# Patient Record
Sex: FEMALE | Race: WHITE | NOT HISPANIC OR LATINO | Employment: UNEMPLOYED | URBAN - METROPOLITAN AREA
[De-identification: names, ages, dates, MRNs, and addresses within clinical notes are randomized per-mention and may not be internally consistent; named-entity substitution may affect disease eponyms.]

---

## 2017-05-12 DIAGNOSIS — G43.109 MIGRAINE WITH AURA AND WITHOUT STATUS MIGRAINOSUS, NOT INTRACTABLE: ICD-10-CM

## 2017-05-12 RX ORDER — IBUPROFEN 600 MG/1
TABLET, FILM COATED ORAL
Qty: 90 TABLET | Refills: 0 | Status: SHIPPED | OUTPATIENT
Start: 2017-05-12 | End: 2017-07-21

## 2017-05-12 NOTE — TELEPHONE ENCOUNTER
Routing refill request to provider for review/approval because:  Vida given x1 and patient did not follow up, please advise  Labs not current:  Alt/Ast/bmp  Required for refill protocol.  Pt has not had them done yet.   Noted, last filled for #90 on 9/23/16

## 2017-05-20 ENCOUNTER — OFFICE VISIT (OUTPATIENT)
Dept: URGENT CARE | Facility: URGENT CARE | Age: 22
End: 2017-05-20
Payer: COMMERCIAL

## 2017-05-20 VITALS
DIASTOLIC BLOOD PRESSURE: 74 MMHG | SYSTOLIC BLOOD PRESSURE: 92 MMHG | BODY MASS INDEX: 18.77 KG/M2 | HEART RATE: 93 BPM | OXYGEN SATURATION: 99 % | WEIGHT: 116.8 LBS | HEIGHT: 66 IN | TEMPERATURE: 98.4 F

## 2017-05-20 DIAGNOSIS — J45.901 ASTHMA EXACERBATION: Primary | ICD-10-CM

## 2017-05-20 PROCEDURE — 99213 OFFICE O/P EST LOW 20 MIN: CPT | Performed by: PHYSICIAN ASSISTANT

## 2017-05-20 RX ORDER — PREDNISONE 20 MG/1
20 TABLET ORAL 2 TIMES DAILY
Qty: 8 TABLET | Refills: 0 | Status: SHIPPED | OUTPATIENT
Start: 2017-05-20 | End: 2017-05-24

## 2017-05-20 NOTE — PROGRESS NOTES
HPI:  Tanya is a 21 yo female who presents for cough & wheezing x 1 week.  Hx of asthma.  Does not need inhaler usually but has been using inhaler 2-3 times a day, usually at night.   Had sore throat but this has resolved.  Denies fever.  Reports mild nasal congestion.    ROS:  See HPI      PE:  Vitals & nursing notes reviewed  Constitutional:  Alert, well nourished, well-developed, NAD  Head:  Atraumatic, normocephalic  Eyes:  Perrla, EOMI, conjunctiva:  Pink   Sclera:  Anicteric  Ears:  Canals clear BL, TM pearly BL  Throat:  No erythema, exudates, or edema to postoropharynx  Neck:  Supple, no cervical LAD  Lungs:  (+) mild wheezes, No rhonchi, or rales  CV:  RRR,  no murmur appreciated      ASSESSMENT:  (J45.901) Asthma exacerbation  (primary encounter diagnosis)  Plan: predniSONE (DELTASONE) 20 MG tablet      Albuterol inhaler PRN for wheezing & SOB.   Cool mist humdifier.  Warm liquids and/or honey for cough spasms and suppression.  Tylenol or advil for pain, HA, muscles aches, & fever PRN.   F/U with PCP if sx persist or worsen.

## 2017-05-20 NOTE — MR AVS SNAPSHOT
"              After Visit Summary   5/20/2017    Tanya Pearson    MRN: [id number]           Patient Information     Date Of Birth          1995        Visit Information        Provider Department      5/20/2017 5:05 PM Bethanie Barrett PA-C [facility]        Today's Diagnoses     Asthma exacerbation    -  1       Follow-ups after your visit        Who to contact     If you have questions or need follow up information about today's clinic visit or your schedule please contact [facility] directly at [phone].  Normal or non-critical lab and imaging results will be communicated to you by [employer], letter or phone within 4 business days after the clinic has received the results. If you do not hear from us within 7 days, please contact the clinic through [employer] or phone. If you have a critical or abnormal lab result, we will notify you by phone as soon as possible.  Submit refill requests through [employer] or call your pharmacy and they will forward the refill request to us. Please allow 3 business days for your refill to be completed.          Additional Information About Your Visit        MyChart Information     [employer] gives you secure access to your electronic health record. If you see a primary care provider, you can also send messages to your care team and make appointments. If you have questions, please call your primary care clinic.  If you do not have a primary care provider, please call [phone] and they will assist you.        Care EveryWhere ID     This is your Care EveryWhere ID. This could be used by other organizations to access your [place] medical records  [id number]        Your Vitals Were     Pulse Temperature Height Last Period Pulse Oximetry BMI (Body Mass Index)    93 98.4  F (36.9  C) (Oral) 5' 6\" (1.676 m) 04/20/2017 (Approximate) 99% 18.85 kg/m2       Blood Pressure from Last 3 Encounters:   05/20/17 92/74   11/30/16 " 120/75   08/31/16 107/75    Weight from Last 3 Encounters:   05/20/17 116 lb 12.8 oz (53 kg)   11/30/16 117 lb (53.1 kg) (28 %)*   01/25/16 118 lb (53.5 kg) (33 %)*     * Growth percentiles are based on [facility] 2-20 Years data.              Today, you had the following     No orders found for display         Today's Medication Changes          These changes are accurate as of: 5/20/17  6:42 PM.  If you have any questions, ask your nurse or doctor.               Start taking these medicines.        Dose/Directions    predniSONE 20 MG tablet   Commonly known as:  DELTASONE   Used for:  Asthma exacerbation   Started by:  Bethanie Barrett PA-C        Dose:  20 mg   Take 1 tablet (20 mg) by mouth 2 times daily for 4 days   Quantity:  8 tablet   Refills:  0            Where to get your medicines      These medications were sent to [facility] [id number] - [place], [place] - [address] AT [facility] [address]  [facility] [address], [place] [address]    Hours:  24-hours Phone:  [phone]     predniSONE 20 MG tablet                Primary Care Provider Office Phone # Fax #    Dara Pickett [phone]       [address] [address]        Thank you!     Thank you for choosing [facility]  for your care. Our goal is always to provide you with excellent care. Hearing back from our patients is one way we can continue to improve our services. Please take a few minutes to complete the written survey that you may receive in the mail after your visit with us. Thank you!             Your Updated Medication List - Protect others around you: Learn how to safely use, store and throw away your medicines at www.disposemymeds.org.          This list is accurate as of: 5/20/17  6:42 PM.  Always use your most recent med list.                   Brand Name Dispense Instructions for use    albuterol 108 (90 BASE) MCG/ACT Inhaler    PROAIR  HFA/PROVENTIL HFA/VENTOLIN HFA    1 Inhaler    Inhale 2 puffs into the lungs every 4 hours as needed for shortness of breath / dyspnea or wheezing       eletriptan 40 MG tablet    RELPAX    18 tablet    Take 1 tablet (40 mg) by mouth at onset of headache for migraine May repeat dose in 2 hours.  Do not exceed 80 mg in 24 hours       gabapentin 300 MG capsule    NEURONTIN    120 capsule    Reported on 5/20/2017       ibuprofen 600 MG tablet    ADVIL/MOTRIN    90 tablet    Take one po q day prn headache.  Needs labs for further refills       MULTI-VITAMINS PO      Reported on 5/20/2017       norethindrone 0.35 MG per tablet    MICRONOR    84 tablet    Take 1 tablet (0.35 mg) by mouth daily       predniSONE 20 MG tablet    DELTASONE    8 tablet    Take 1 tablet (20 mg) by mouth 2 times daily for 4 days

## 2017-05-20 NOTE — NURSING NOTE
"Chief Complaint   Patient presents with     Urgent Care     Asthma     Reports hx of mild seasonal asthma, usually under good control, doesn't take maintenance inhaler.  Just over a week ago started to have sore throat and cough; sore throat resolved but productive cough and stuffy nose have continued; also was cleaning out ricky house recently.  Has been feeling short of breath last few days and wheezing at night.  Has been using inhaler x 2 at night but doesn't really help.     Initial BP 92/74  Pulse 93  Temp 98.4  F (36.9  C) (Oral)  Ht 5' 6\" (1.676 m)  Wt 116 lb 12.8 oz (53 kg)  LMP 04/20/2017 (Approximate)  SpO2 99%  BMI 18.85 kg/m2 Estimated body mass index is 18.85 kg/(m^2) as calculated from the following:    Height as of this encounter: 5' 6\" (1.676 m).    Weight as of this encounter: 116 lb 12.8 oz (53 kg)..  BP completed using cuff size: small eren Rangel RN  "

## 2017-07-21 DIAGNOSIS — G43.109 MIGRAINE WITH AURA AND WITHOUT STATUS MIGRAINOSUS, NOT INTRACTABLE: ICD-10-CM

## 2017-07-21 RX ORDER — IBUPROFEN 600 MG/1
TABLET, FILM COATED ORAL
Qty: 30 TABLET | Refills: 0 | Status: SHIPPED | OUTPATIENT
Start: 2017-07-21 | End: 2017-08-07

## 2017-07-21 NOTE — TELEPHONE ENCOUNTER
Medication is being filled for 1 time refill only due to:  Patient needs labs BMP/ast/alt. Future labs ordered Yes.

## 2017-08-04 RX ORDER — ACETAMINOPHEN AND CODEINE PHOSPHATE 120; 12 MG/5ML; MG/5ML
1 SOLUTION ORAL DAILY
Qty: 84 TABLET | Refills: 3 | Status: CANCELLED | OUTPATIENT
Start: 2017-08-04

## 2017-08-04 RX ORDER — ELETRIPTAN HYDROBROMIDE 40 MG/1
40 TABLET, FILM COATED ORAL
Qty: 18 TABLET | Refills: 1 | Status: CANCELLED | OUTPATIENT
Start: 2017-08-04

## 2017-08-04 RX ORDER — IBUPROFEN 600 MG/1
TABLET, FILM COATED ORAL
Qty: 30 TABLET | Refills: 0 | Status: CANCELLED | OUTPATIENT
Start: 2017-08-04

## 2017-08-04 RX ORDER — ALBUTEROL SULFATE 90 UG/1
2 AEROSOL, METERED RESPIRATORY (INHALATION) EVERY 4 HOURS PRN
Qty: 1 INHALER | Refills: 3 | Status: CANCELLED | OUTPATIENT
Start: 2017-08-04

## 2017-08-04 RX ORDER — GABAPENTIN 300 MG/1
CAPSULE ORAL
Qty: 120 CAPSULE | Refills: 11 | Status: CANCELLED | OUTPATIENT
Start: 2017-08-04

## 2017-08-07 DIAGNOSIS — G43.109 MIGRAINE WITH AURA AND WITHOUT STATUS MIGRAINOSUS, NOT INTRACTABLE: ICD-10-CM

## 2017-08-07 RX ORDER — IBUPROFEN 600 MG/1
TABLET, FILM COATED ORAL
Qty: 30 TABLET | Refills: 0 | Status: ON HOLD | OUTPATIENT
Start: 2017-08-07 | End: 2021-04-19

## 2017-08-07 NOTE — TELEPHONE ENCOUNTER
Pt has appt for this week with Nieves,  Per MD OK to refill today and get labs at appt.  Put labs needed in appt notes

## 2017-08-09 ENCOUNTER — OFFICE VISIT (OUTPATIENT)
Dept: FAMILY MEDICINE | Facility: CLINIC | Age: 22
End: 2017-08-09

## 2017-08-09 VITALS
BODY MASS INDEX: 19.13 KG/M2 | HEIGHT: 66 IN | DIASTOLIC BLOOD PRESSURE: 79 MMHG | OXYGEN SATURATION: 99 % | TEMPERATURE: 97.8 F | HEART RATE: 79 BPM | SYSTOLIC BLOOD PRESSURE: 121 MMHG | WEIGHT: 119 LBS

## 2017-08-09 DIAGNOSIS — I73.00 RAYNAUD'S DISEASE WITHOUT GANGRENE: Primary | ICD-10-CM

## 2017-08-09 ASSESSMENT — PAIN SCALES - GENERAL: PAINLEVEL: NO PAIN (0)

## 2017-08-09 NOTE — NURSING NOTE
"22 year old  Chief Complaint   Patient presents with     RECHECK     discuss bad circulation in toes and fingers. Toes turn almost black when cold. Very red when hot. Onset for the past few years. Denies any pain or sensations when they turn different colors.       Blood pressure 121/79, pulse 79, temperature 97.8  F (36.6  C), temperature source Oral, height 5' 5.98\" (167.6 cm), weight 119 lb (54 kg), SpO2 99 %, not currently breastfeeding. Body mass index is 19.22 kg/(m^2).  Patient Active Problem List   Diagnosis     Migraine with aura and without status migrainosus, not intractable     Mild persistent asthma without complication     Dysmenorrhea     Generalized anxiety disorder       Wt Readings from Last 2 Encounters:   08/09/17 119 lb (54 kg)   05/20/17 116 lb 12.8 oz (53 kg)     BP Readings from Last 3 Encounters:   08/09/17 121/79   05/20/17 92/74   11/30/16 120/75         Current Outpatient Prescriptions   Medication     ibuprofen (ADVIL/MOTRIN) 600 MG tablet     albuterol (PROAIR HFA/PROVENTIL HFA/VENTOLIN HFA) 108 (90 BASE) MCG/ACT Inhaler     norethindrone (MICRONOR) 0.35 MG per tablet     eletriptan (RELPAX) 40 MG tablet     gabapentin (NEURONTIN) 300 MG capsule     Multiple Vitamin (MULTI-VITAMINS PO)     No current facility-administered medications for this visit.        Social History   Substance Use Topics     Smoking status: Never Smoker     Smokeless tobacco: Never Used     Alcohol use No       Health Maintenance Due   Topic Date Due     CHLAMYDIA SCREENING  1995     ASTHMA ACTION PLAN Q1 YR  02/10/2002     ASTHMA CONTROL TEST Q6 MOS  05/30/2017       No results found for: RUSLAN Valenzuela MA  August 9, 2017 4:07 PM    "

## 2017-08-09 NOTE — MR AVS SNAPSHOT
"              After Visit Summary   8/9/2017    Tanya Pearson    MRN: [id number]           Patient Information     Date Of Birth          1995        Visit Information        Provider Department      8/9/2017 4:00 PM Dara Pickett MD [facility]        Today's Diagnoses     Raynaud's disease    -  1       Follow-ups after your visit        Who to contact     Please call your clinic at [phone] to:    Ask questions about your health    Make or cancel appointments    Discuss your medicines    Learn about your test results    Speak to your doctor   If you have compliments or concerns about an experience at your clinic, or if you wish to file a complaint, please contact [facility] Patient Relations at [phone] or email us at [email or web address]         Additional Information About Your Visit        MyChart Information     [employer] gives you secure access to your electronic health record. If you see a primary care provider, you can also send messages to your care team and make appointments. If you have questions, please call your primary care clinic.  If you do not have a primary care provider, please call [phone] and they will assist you.      [employer] is an electronic gateway that provides easy, online access to your medical records. With [employer], you can request a clinic appointment, read your test results, renew a prescription or communicate with your care team.     To access your existing account, please contact your [facility] or call [phone] for assistance.        Care EveryWhere ID     This is your Care EveryWhere ID. This could be used by other organizations to access your [place] medical records  [id number]        Your Vitals Were     Pulse Temperature Height Pulse Oximetry BMI (Body Mass Index)       79 97.8  F (36.6  C) (Oral) 5' 5.98\" (167.6 cm) 99% 19.22 kg/m2        Blood Pressure from " Last 3 Encounters:   08/09/17 121/79   05/20/17 92/74   11/30/16 120/75    Weight from Last 3 Encounters:   08/09/17 119 lb (54 kg)   05/20/17 116 lb 12.8 oz (53 kg)   11/30/16 117 lb (53.1 kg) (28 %)*     * Growth percentiles are based on [facility] 2-20 Years data.              Today, you had the following     No orders found for display       Primary Care Provider Office Phone # Fax #    Dara Pickett [phone] [address] [place] [address]        Equal Access to Services     [facility]: Hadii makenzie Edouard, sacha fragoso, radha sequeira, tani anderson . So [facility] [phone].    ATENCIÓN: Si habla español, tiene a sherwood disposición servicios gratuitos de asistencia lingüística. [facility] [phone].    We comply with applicable federal civil rights laws and [place] laws. We do not discriminate on the basis of race, color, national origin, age, disability sex, sexual orientation or gender identity.            Thank you!     Thank you for choosing [facility]  for your care. Our goal is always to provide you with excellent care. Hearing back from our patients is one way we can continue to improve our services. Please take a few minutes to complete the written survey that you may receive in the mail after your visit with us. Thank you!             Your Updated Medication List - Protect others around you: Learn how to safely use, store and throw away your medicines at www.disposemymeds.org.          This list is accurate as of: 8/9/17  4:24 PM.  Always use your most recent med list.                   Brand Name Dispense Instructions for use Diagnosis    albuterol 108 (90 BASE) MCG/ACT Inhaler    PROAIR HFA/PROVENTIL HFA/VENTOLIN HFA    1 Inhaler    Inhale 2 puffs into the lungs every 4 hours as needed for shortness of breath / dyspnea or wheezing    Mild intermittent asthma without complication       eletriptan 40 MG  tablet    RELPAX    18 tablet    Take 1 tablet (40 mg) by mouth at onset of headache for migraine May repeat dose in 2 hours.  Do not exceed 80 mg in 24 hours    Migraine with aura and without status migrainosus, not intractable       gabapentin 300 MG capsule    NEURONTIN    120 capsule    Reported on 5/20/2017        ibuprofen 600 MG tablet    ADVIL/MOTRIN    30 tablet    Take one po q day prn headache.  Needs labs for further refills    Migraine with aura and without status migrainosus, not intractable       MULTI-VITAMINS PO      Reported on 5/20/2017        norethindrone 0.35 MG per tablet    MICRONOR    84 tablet    Take 1 tablet (0.35 mg) by mouth daily    Dysmenorrhea

## 2017-08-09 NOTE — PROGRESS NOTES
Tanya Pearson is a 22 year old female here for the following issues:    Concerns about circulation  Tanya is a 21 yo woman, accompanied by her mother in clinic today. She really has no complaint but her mom was very concerned that her feet looked blue. Tanya reports this comes and goes and occurs without pain or skin changes. She described cold fingertips and toes, worse in the winter months. Her sister has diagnosis of pernio. She is a healthy 21 yo woman, without history of joint pain, skin rashes, hypertension. She does not smoke.     ROS:  Gen: no malaise or fever  Cor : no chest pain or palpitations  Lung: no SOB or dyspnea  GI: no change in appetite, no pain  Ext: no pain, but extremities feel cold, no skin break down  MS: no arthralgias    Patient Active Problem List   Diagnosis     Migraine with aura and without status migrainosus, not intractable     Mild persistent asthma without complication     Dysmenorrhea     Generalized anxiety disorder       Current Outpatient Prescriptions   Medication Sig Dispense Refill     ibuprofen (ADVIL/MOTRIN) 600 MG tablet Take one po q day prn headache.  Needs labs for further refills 30 tablet 0     albuterol (PROAIR HFA/PROVENTIL HFA/VENTOLIN HFA) 108 (90 BASE) MCG/ACT Inhaler Inhale 2 puffs into the lungs every 4 hours as needed for shortness of breath / dyspnea or wheezing 1 Inhaler 3     norethindrone (MICRONOR) 0.35 MG per tablet Take 1 tablet (0.35 mg) by mouth daily 84 tablet 3     eletriptan (RELPAX) 40 MG tablet Take 1 tablet (40 mg) by mouth at onset of headache for migraine May repeat dose in 2 hours.  Do not exceed 80 mg in 24 hours (Patient not taking: Reported on 5/20/2017) 18 tablet 1     gabapentin (NEURONTIN) 300 MG capsule Reported on 5/20/2017 120 capsule 11     Multiple Vitamin (MULTI-VITAMINS PO) Reported on 5/20/2017         Allergies   Allergen Reactions     Amoxicillin Rash        EXAM  /79 (BP Location: Right arm, Patient Position:  "Chair, Cuff Size: Adult Regular)  Pulse 79  Temp 97.8  F (36.6  C) (Oral)  Ht 5' 5.98\" (167.6 cm)  Wt 119 lb (54 kg)  SpO2 99%  BMI 19.22 kg/m2  Gen: Alert, pleasant, NAD  Cor: S1S2 no murmur  Lungs CTA  Hands: fingertips are cool but pink fingertips. Radial pulses strong  Feet: Dusky appearance, symmetric over both feet. DP and posterior tibial pulses are full. No subjective tingling or pain.  Tips of toes are cool.   Skin: no ulcerations, no redness      Assessment:  (I73.00) Raynaud's disease  (primary encounter diagnosis)  Comment: discussed typical symptoms of Raynauds or benign acrocyanosis with Tanya and her mother  Plan: reassurance, recommend keeping hands/ feet warm in cooler weather.   Contact MD for any acute changes, pain, redness, swelling or ulcerations    Dara Pickett MD  Internal Medicine/Pediatrics    "

## 2017-09-07 ASSESSMENT — PATIENT HEALTH QUESTIONNAIRE - PHQ9: SUM OF ALL RESPONSES TO PHQ QUESTIONS 1-9: 2

## 2017-11-28 DIAGNOSIS — N94.6 DYSMENORRHEA: ICD-10-CM

## 2017-11-28 RX ORDER — ACETAMINOPHEN AND CODEINE PHOSPHATE 120; 12 MG/5ML; MG/5ML
1 SOLUTION ORAL DAILY
Qty: 84 TABLET | Refills: 3 | Status: SHIPPED | OUTPATIENT
Start: 2017-11-28 | End: 2019-02-22

## 2018-04-14 ENCOUNTER — HEALTH MAINTENANCE LETTER (OUTPATIENT)
Age: 23
End: 2018-04-14

## 2019-01-08 ENCOUNTER — OFFICE VISIT (OUTPATIENT)
Dept: FAMILY MEDICINE | Facility: CLINIC | Age: 24
End: 2019-01-08
Payer: COMMERCIAL

## 2019-01-08 VITALS
WEIGHT: 119 LBS | SYSTOLIC BLOOD PRESSURE: 122 MMHG | HEART RATE: 72 BPM | OXYGEN SATURATION: 97 % | DIASTOLIC BLOOD PRESSURE: 77 MMHG | BODY MASS INDEX: 19.22 KG/M2 | TEMPERATURE: 97.4 F

## 2019-01-08 DIAGNOSIS — M76.61 ACHILLES TENDINITIS, RIGHT LEG: ICD-10-CM

## 2019-01-08 DIAGNOSIS — J06.9 VIRAL UPPER RESPIRATORY TRACT INFECTION: Primary | ICD-10-CM

## 2019-01-08 DIAGNOSIS — R05.9 COUGH: ICD-10-CM

## 2019-01-08 DIAGNOSIS — S89.91XA INJURY OF PATELLA, RIGHT, INITIAL ENCOUNTER: ICD-10-CM

## 2019-01-08 DIAGNOSIS — J45.20 MILD INTERMITTENT ASTHMA WITHOUT COMPLICATION: ICD-10-CM

## 2019-01-08 RX ORDER — ALBUTEROL SULFATE 90 UG/1
2 AEROSOL, METERED RESPIRATORY (INHALATION) EVERY 6 HOURS PRN
Qty: 1 INHALER | Refills: 3 | Status: SHIPPED | OUTPATIENT
Start: 2019-01-08 | End: 2021-10-08

## 2019-01-08 RX ORDER — BENZONATATE 200 MG/1
200 CAPSULE ORAL 3 TIMES DAILY PRN
Qty: 30 CAPSULE | Refills: 0 | Status: SHIPPED | OUTPATIENT
Start: 2019-01-08 | End: 2019-02-22

## 2019-01-08 NOTE — NURSING NOTE
23 year old  Chief Complaint   Patient presents with     Cough     pt has been coughing for about a week. Sometimes has flym coming up but then goes back down.      Derm Problem     pt has a bruise on the back of her foot that pt's mom would like looked at. Also has a balck and blue knee mom would lik looked at.       Blood pressure 122/77, pulse 72, temperature 97.4  F (36.3  C), temperature source Oral, weight 54 kg (119 lb), SpO2 97 %, not currently breastfeeding. Body mass index is 19.22 kg/m .  Patient Active Problem List   Diagnosis     Migraine with aura and without status migrainosus, not intractable     Mild persistent asthma without complication     Dysmenorrhea     Generalized anxiety disorder     Raynaud's disease       Wt Readings from Last 2 Encounters:   01/08/19 54 kg (119 lb)   08/09/17 54 kg (119 lb)     BP Readings from Last 3 Encounters:   01/08/19 122/77   08/09/17 121/79   05/20/17 92/74         Current Outpatient Medications   Medication     albuterol (PROAIR HFA/PROVENTIL HFA/VENTOLIN HFA) 108 (90 BASE) MCG/ACT Inhaler     eletriptan (RELPAX) 40 MG tablet     gabapentin (NEURONTIN) 300 MG capsule     ibuprofen (ADVIL/MOTRIN) 600 MG tablet     Multiple Vitamin (MULTI-VITAMINS PO)     norethindrone (MICRONOR) 0.35 MG per tablet     No current facility-administered medications for this visit.        Social History     Tobacco Use     Smoking status: Never Smoker     Smokeless tobacco: Never Used   Substance Use Topics     Alcohol use: No     Alcohol/week: 0.0 oz     Drug use: No       Health Maintenance Due   Topic Date Due     CHLAMYDIA SCREENING  1995     ASTHMA ACTION PLAN Q1 YR  02/10/2002     DTAP/TDAP/TD IMMUNIZATION (6 - Tdap) 02/10/2008     HIV SCREEN (SYSTEM ASSIGNED)  02/10/2015     ASTHMA CONTROL TEST Q6 MOS  05/30/2017     PAP SCREENING Q3 YR (SYSTEM ASSIGNED)  02/10/2018     PHQ-2 Q1 YR  08/09/2018     INFLUENZA VACCINE (1) 09/01/2018       No results found for:  PAP      January 8, 2019 3:22 PM

## 2019-01-08 NOTE — PROGRESS NOTES
SUBJECTIVE:   Tanya Pearson is a 23 year old female who presents to clinic today for the following health issues. She is accompanied by her mother Patty in clinic today. Tanya's PCP is Dr. Pickett.     Asthma Follow-Up  Was ACT completed today?    Yes    ACT Total Scores 9/7/2017   ACT TOTAL SCORE (Goal Greater than or Equal to 20) 25   In the past 12 months, how many times did you visit the emergency room for your asthma without being admitted to the hospital? -   In the past 12 months, how many times were you hospitalized overnight because of your asthma? -       Recent asthma triggers that patient is dealing with: upper respiratory infections      Acute Illness   Acute illness concerns: Tanya presents today with 1 week history of a worsening cough. She is not sleeping well at night because of the cough. She has a history of asthma,  she is out of her albuterol inhaler. She used her sister's albuterol inhaler today, it helped for a short period of time. She is feeling short of breath. She had a coughing fit this morning and her mom questioned taking her to the ED.   Onset: 1 week ago    Fever: no    Chills/Sweats: no    Headache (location?): no    Sinus Pressure:no    Conjunctivitis:  no    Ear Pain: no    Rhinorrhea: YES    Congestion: YES    Sore Throat: YES     Cough: YES-worsening over time    Wheeze: no    Decreased Appetite: no    Nausea: no    Vomiting: no    Diarrhea:  no    Dysuria/Freq.: no    Fatigue/Achiness: YES    Sick/Strep Exposure: YES- her older sister has similar symptoms.      Therapies Tried and outcome: She has tried benadryl, Nyquil, and tylenol without relief.       Knee pain and posterior right heel pain  She was at the bar 1/4/19 and someone wearing high heels stepped on her ankle. Her ankle is bruised and swollen. She also fell on her right knee 1/5/19 dancing and has a large bruise. She has no pain with walking. Tanya's mom is concerned she needs to be seen by sports  medicine. Mom wonders if Tanya broke her patella.     Problem list and histories reviewed & adjusted, as indicated.  Additional history: none    Patient Active Problem List   Diagnosis     Migraine with aura and without status migrainosus, not intractable     Mild intermittent asthma without complication     Dysmenorrhea     Generalized anxiety disorder     Raynaud's disease     Past Surgical History:   Procedure Laterality Date     NO HISTORY OF SURGERY         Social History     Tobacco Use     Smoking status: Never Smoker     Smokeless tobacco: Never Used   Substance Use Topics     Alcohol use: No     Alcohol/week: 0.0 oz     Family History   Problem Relation Age of Onset     Cancer Maternal Grandmother         skin cancer     Breast Cancer Paternal Grandmother 76         Current Outpatient Medications   Medication Sig Dispense Refill     albuterol (PROAIR HFA/PROVENTIL HFA/VENTOLIN HFA) 108 (90 Base) MCG/ACT inhaler Inhale 2 puffs into the lungs every 6 hours as needed for shortness of breath / dyspnea or wheezing 1 Inhaler 3     benzonatate (TESSALON) 200 MG capsule Take 1 capsule (200 mg) by mouth 3 times daily as needed for cough 30 capsule 0     eletriptan (RELPAX) 40 MG tablet Take 1 tablet (40 mg) by mouth at onset of headache for migraine May repeat dose in 2 hours.  Do not exceed 80 mg in 24 hours 18 tablet 1     gabapentin (NEURONTIN) 300 MG capsule Reported on 5/20/2017 120 capsule 11     ibuprofen (ADVIL/MOTRIN) 600 MG tablet Take one po q day prn headache.  Needs labs for further refills 30 tablet 0     Multiple Vitamin (MULTI-VITAMINS PO) Reported on 5/20/2017       norethindrone (MICRONOR) 0.35 MG per tablet Take 1 tablet (0.35 mg) by mouth daily 84 tablet 3     Allergies   Allergen Reactions     Amoxicillin Rash       Reviewed and updated as needed this visit by clinical staff  Tobacco  Allergies  Meds  Problems  Med Hx  Surg Hx  Fam Hx       Reviewed and updated as needed this visit by  Provider  Tobacco  Allergies  Meds  Problems  Med Hx  Surg Hx  Fam Hx         ROS:  Constitutional, HEENT, cardiovascular, pulmonary, gi and gu systems are negative, except as otherwise noted.    OBJECTIVE:     /77   Pulse 72   Temp 97.4  F (36.3  C) (Oral)   Wt 54 kg (119 lb)   SpO2 97%   BMI 19.22 kg/m    Body mass index is 19.22 kg/m .  GENERAL: healthy, alert and no distress  EYES: Eyes grossly normal to inspection, PERRL and conjunctivae and sclerae normal  HENT: ear canals and TM's normal, nose and mouth without ulcers or lesions  NECK: no adenopathy, no asymmetry, masses, or scars and thyroid normal to palpation  RESP: lungs clear to auscultation - no rales, rhonchi or wheezes.  No wheeze with forced expiration.    CV: regular rate and rhythm, normal S1 S2, no S3 or S4, no murmur, click or rub, no peripheral edema and peripheral pulses strong  MS: no gross musculoskeletal defects noted, no edema. Ecchymosis noted medial right patella.  Patella moves easily without pain. No tenderness with palpation over the patella.  Right heel:  Without redness swelling or deformity.  Tenderness with palpation over the distal achilles tendon. ROSIO.  Sensation intact  SKIN: no suspicious lesions or rashes      ASSESSMENT/PLAN:       ICD-10-CM    1. Viral upper respiratory tract infection J06.9 benzonatate (TESSALON) 200 MG capsule   2. Mild intermittent asthma without complication J45.20 albuterol (PROAIR HFA/PROVENTIL HFA/VENTOLIN HFA) 108 (90 Base) MCG/ACT inhaler   3. Injury of patella, right, initial encounter S89.91XA    4. Achilles tendinitis, right leg M76.61    5. Cough R05 benzonatate (TESSALON) 200 MG capsule     Fluids rest, tylenol or ibuprofen. Benzonatate for cough.  Suspect viral etiology.  I recommend no antibiotic at this time.  Use the albuterol for cough and wheezing.  If it is not helpful there may not be any wheezing/asthma reaction occurring.    Reassurance regarding injuries.  Suspect  they will resolve over the next week.  Follow up if persistent concerns.    Encouraged her to make an appointment with Dr. Pickett for a check-up. She is due for PAP    Bethanie Bermudez PA-C  [facility]    I, Savi Treviño, am serving as a scribe to document services personally performed by Bethanie Bermudez PA-C, based on data collection and the provider's statements to me. Bethanie Bermudez PA-C, has reviewed, edited, and approved the above note.

## 2019-01-15 ENCOUNTER — NURSE TRIAGE (OUTPATIENT)
Dept: NURSING | Facility: CLINIC | Age: 24
End: 2019-01-15

## 2019-01-15 ASSESSMENT — ANXIETY QUESTIONNAIRES
2. NOT BEING ABLE TO STOP OR CONTROL WORRYING: NOT AT ALL
GAD7 TOTAL SCORE: 0
7. FEELING AFRAID AS IF SOMETHING AWFUL MIGHT HAPPEN: NOT AT ALL
3. WORRYING TOO MUCH ABOUT DIFFERENT THINGS: NOT AT ALL
5. BEING SO RESTLESS THAT IT IS HARD TO SIT STILL: NOT AT ALL
1. FEELING NERVOUS, ANXIOUS, OR ON EDGE: NOT AT ALL
6. BECOMING EASILY ANNOYED OR IRRITABLE: NOT AT ALL

## 2019-01-15 ASSESSMENT — PATIENT HEALTH QUESTIONNAIRE - PHQ9
SUM OF ALL RESPONSES TO PHQ QUESTIONS 1-9: 0
5. POOR APPETITE OR OVEREATING: NOT AT ALL

## 2019-01-15 NOTE — TELEPHONE ENCOUNTER
Additional Information    Negative: [1] SEVERE weakness (i.e., unable to walk or barely able to walk, requires support) AND [2] new onset or worsening    Negative: [1] Weakness (i.e., paralysis, loss of muscle strength) of the face, arm / hand, or leg / foot on one side of the body AND [2] sudden onset AND [3] present now    Negative: [1] Numbness (i.e., loss of sensation) of the face, arm / hand, or leg / foot on one side of the body AND [2] sudden onset AND [3] present now    Negative: [1] Loss of speech or garbled speech AND [2] sudden onset AND [3] present now    Negative: Difficult to awaken or acting confused  (e.g., disoriented, slurred speech)    Negative: Sounds like a life-threatening emergency to the triager    Negative: Confusion, disorientation, or hallucinations is main symptom    Negative: Neck pain is main symptom (and having weakness, numbness, or tingling in arm / hand because of neck pain)    Negative: Back pain is main symptom (and having weakness, numbness, or tingling in leg because of back pain)    Negative: Hand pain is main symptom (and having mild weakness, numbness, or tingling in hand related to hand pain)    Negative: Dizziness is main symptom    Negative: Vision loss or change is main symptom    Negative: Followed a head injury within last 3 days    Negative: Followed a neck injury within last 3 days    [1] Tingling in both hands and/or feet AND [2] breathing faster than normal AND [3] feels similar to prior panic attack or hyperventilation episode    Negative: Severe difficulty breathing (e.g., struggling for each breath, speaks in single words)    Negative: Bluish lips, tongue, or face now    Negative: Difficult to awaken or acting confused  (e.g., disoriented, slurred speech)    Negative: Hysterical or combative behavior    Negative: Sounds like a life-threatening emergency to the triager    Negative: Chest pain    Negative: Palpitations, skipped heart beat, or rapid heart  beat    Negative: Cough is main symptom    Negative: Suicide thoughts, threats, attempts, or questions    Negative: Depression is main problem or symptom (e.g., feelings of sadness or hopelessness)    Negative: [1] Difficulty breathing AND [2] persists > 10 minutes AND [3] not relieved by reassurance provided by triager    Negative: [1] Lightheadedness or dizziness AND [2] persists > 10 minutes AND [3] not relieved by reassurance provided by triager    Negative: Taking medication containing theophylline (e.g., Theodur)    Negative: [1] Known or suspected alcohol or drug abuse AND [2] feeling very shaky (i.e., visible tremors of hands)    Negative: Patient sounds very sick or weak to the triager    Negative: Symptoms interfere with work or school    Negative: Requesting to talk to a counselor (e.g., mental health worker, psychiatrist)    Negative: Patient sounds very upset or troubled to the triager    Negative: [1] Symptoms of anxiety or panic AND [2] has not been evaluated for this by physician    Negative: [1] Started on anti-anxiety medication AND [2] no relief    Negative: [1] Significant weight loss (or gain) AND [2] not dieting    Negative: Taking thyroid medications    Negative: Known or suspected caffeine abuse (e.g., > 2 cups of coffee/tea or > 4 cans of soda / day)    Negative: Known or suspected alcohol or drug abuse    Negative: Taking herbal remedies    Negative: Recent traumatic event (e.g., death of a loved one, job loss, victim/witness of crime)    Negative: Symptoms interfere with sleep or daily activities    Negative: [1] Symptoms of anxiety or panic attack AND [2] is a chronic symptom (recurrent or ongoing AND present > 4 weeks)    Normal anxiety (all triage questions negative)    Protocols used: NEUROLOGIC DEFICIT-ADULT-, ANXIETY AND PANIC ATTACK-ADULT-    Mother is calling and states daughter took her tessalon pearls along with some benadryl for her cough. Client has a viral upper  respiratory infection. Client states she can't fall asleep and is having some tingling in her arms. Caller denies any suicidal ideations. Triage guidelines recommend to home care. Caller verbalized and understands directives.

## 2019-01-16 DIAGNOSIS — R05.9 COUGH: Primary | ICD-10-CM

## 2019-01-16 RX ORDER — PREDNISONE 20 MG/1
TABLET ORAL
Qty: 10 TABLET | Refills: 0 | Status: SHIPPED | OUTPATIENT
Start: 2019-01-16 | End: 2019-02-22

## 2019-01-16 ASSESSMENT — ANXIETY QUESTIONNAIRES: GAD7 TOTAL SCORE: 0

## 2019-02-22 ENCOUNTER — OFFICE VISIT (OUTPATIENT)
Dept: FAMILY MEDICINE | Facility: CLINIC | Age: 24
End: 2019-02-22
Payer: COMMERCIAL

## 2019-02-22 VITALS
TEMPERATURE: 97.8 F | DIASTOLIC BLOOD PRESSURE: 75 MMHG | HEART RATE: 82 BPM | OXYGEN SATURATION: 97 % | WEIGHT: 121.25 LBS | SYSTOLIC BLOOD PRESSURE: 122 MMHG | BODY MASS INDEX: 19.58 KG/M2

## 2019-02-22 DIAGNOSIS — N64.52 NIPPLE DISCHARGE IN FEMALE: Primary | ICD-10-CM

## 2019-02-22 LAB
FSH SERPL-ACNC: 5.3 IU/L
LH SERPL-ACNC: 1.9 IU/L
PROLACTIN SERPL-MCNC: 7 UG/L (ref 3–27)
TSH SERPL DL<=0.005 MIU/L-ACNC: 0.71 MU/L (ref 0.4–4)

## 2019-02-22 ASSESSMENT — ANXIETY QUESTIONNAIRES
6. BECOMING EASILY ANNOYED OR IRRITABLE: NOT AT ALL
1. FEELING NERVOUS, ANXIOUS, OR ON EDGE: NOT AT ALL
5. BEING SO RESTLESS THAT IT IS HARD TO SIT STILL: NOT AT ALL
GAD7 TOTAL SCORE: 1
7. FEELING AFRAID AS IF SOMETHING AWFUL MIGHT HAPPEN: NOT AT ALL
3. WORRYING TOO MUCH ABOUT DIFFERENT THINGS: SEVERAL DAYS
2. NOT BEING ABLE TO STOP OR CONTROL WORRYING: NOT AT ALL

## 2019-02-22 ASSESSMENT — PATIENT HEALTH QUESTIONNAIRE - PHQ9
5. POOR APPETITE OR OVEREATING: NOT AT ALL
SUM OF ALL RESPONSES TO PHQ QUESTIONS 1-9: 0

## 2019-02-22 NOTE — NURSING NOTE
24 year old  Chief Complaint   Patient presents with     Breast Problem     leakage from right breast pt noticed about 2 weeks ago       Blood pressure 122/75, pulse 82, temperature 97.8  F (36.6  C), temperature source Oral, weight 55 kg (121 lb 4 oz), last menstrual period 02/21/2019, SpO2 97 %, not currently breastfeeding. Body mass index is 19.58 kg/m .  Patient Active Problem List   Diagnosis     Migraine with aura and without status migrainosus, not intractable     Mild intermittent asthma without complication     Dysmenorrhea     Generalized anxiety disorder     Raynaud's disease       Wt Readings from Last 2 Encounters:   02/22/19 55 kg (121 lb 4 oz)   01/08/19 54 kg (119 lb)     BP Readings from Last 3 Encounters:   02/22/19 122/75   01/08/19 122/77   08/09/17 121/79         Current Outpatient Medications   Medication     albuterol (PROAIR HFA/PROVENTIL HFA/VENTOLIN HFA) 108 (90 Base) MCG/ACT inhaler     eletriptan (RELPAX) 40 MG tablet     gabapentin (NEURONTIN) 300 MG capsule     ibuprofen (ADVIL/MOTRIN) 600 MG tablet     Multiple Vitamin (MULTI-VITAMINS PO)     norethindrone (MICRONOR) 0.35 MG per tablet     predniSONE (DELTASONE) 20 MG tablet     No current facility-administered medications for this visit.        Social History     Tobacco Use     Smoking status: Never Smoker     Smokeless tobacco: Never Used   Substance Use Topics     Alcohol use: No     Alcohol/week: 0.0 oz     Drug use: No       Health Maintenance Due   Topic Date Due     PREVENTIVE CARE VISIT  1997     CHLAMYDIA SCREENING  1997     ASTHMA ACTION PLAN Q1 YR  02/10/2002     HIV SCREEN (SYSTEM ASSIGNED)  02/10/2015     ASTHMA CONTROL TEST Q6 MOS  05/30/2017     PAP SCREENING Q3 YR (SYSTEM ASSIGNED)  02/10/2018     INFLUENZA VACCINE (1) 09/01/2018       No results found for: PAP      February 22, 2019 3:33 PM

## 2019-02-22 NOTE — PROGRESS NOTES
Tanya Pearson is a 24 year old female here for the following issues:    Nipple discharge in female   Tanya is a 25 yo female who presents with right nipple discharge.  She first noticed this two weeks ago. Discharge is white, watery. Discharged noted only with active compression of nipple, not spontaneous.  No breast pain or masses. No headaches or vision problems. No injury. Her periods have been regular,  Patient's last menstrual period was 02/21/2019. Denies fever.     Exposure to Mononucleosis  Tanya's sister was recently diagnosed with mono. Mom asks if Tanya should be tested since they share a room. She currently denies fever, fatigue, sore throat or adenopathy.    Declines flu vacccine    Patient Active Problem List   Diagnosis     Migraine with aura and without status migrainosus, not intractable     Mild intermittent asthma without complication     Dysmenorrhea     Generalized anxiety disorder     Raynaud's disease       Current Outpatient Medications   Medication Sig Dispense Refill     albuterol (PROAIR HFA/PROVENTIL HFA/VENTOLIN HFA) 108 (90 Base) MCG/ACT inhaler Inhale 2 puffs into the lungs every 6 hours as needed for shortness of breath / dyspnea or wheezing 1 Inhaler 3     eletriptan (RELPAX) 40 MG tablet Take 1 tablet (40 mg) by mouth at onset of headache for migraine May repeat dose in 2 hours.  Do not exceed 80 mg in 24 hours 18 tablet 1     gabapentin (NEURONTIN) 300 MG capsule Reported on 5/20/2017 120 capsule 11     ibuprofen (ADVIL/MOTRIN) 600 MG tablet Take one po q day prn headache.  Needs labs for further refills 30 tablet 0     Multiple Vitamin (MULTI-VITAMINS PO) Reported on 5/20/2017       norethindrone (MICRONOR) 0.35 MG per tablet Take 1 tablet (0.35 mg) by mouth daily 84 tablet 3     predniSONE (DELTASONE) 20 MG tablet Take 2 tablets once daily x 5 day. 10 tablet 0       Allergies   Allergen Reactions     Amoxicillin Rash        EXAM  /75   Pulse 82   Temp 97.8  F  (36.6  C) (Oral)   Wt 55 kg (121 lb 4 oz)   LMP 02/21/2019   SpO2 97%   BMI 19.58 kg/m    Gen: Alert, pleasant, NAD  Eyes: PERRL, EOMI, Peripheral visual fields grossly intact  Breasts: normal without suspicious masses, skin changes or axillary nodes.  Cannot appreciate any nipple discharge at time of exam.     PHQ9 score = 0  SMITA 7 score = 1      Assessment:  (N64.52) Nipple discharge in female  (primary encounter diagnosis)  Comment: right breast, normal exam  Plan: Prolactin, TSH with free T4 reflex, Follicle         stimulating hormone, Lutropin        Check hormone studies, low threshold to image sella if prolactin level is elevated    Addendum: normal exam, normal labs, monitor clinically and consider imaging if symptoms worsening or persistent. Would refer to breast center to consider ductogram.     Dara Pickett MD  Internal Medicine/Pediatrics      I, Savi Treviño, am serving as a scribe to document services personally performed by Dr. Dara Pickett, based on data collection and the provider's statements to me. Dr. Pickett has reviewed, edited, and approved the above note.

## 2019-02-23 ASSESSMENT — ANXIETY QUESTIONNAIRES: GAD7 TOTAL SCORE: 1

## 2019-05-31 ENCOUNTER — OFFICE VISIT (OUTPATIENT)
Dept: FAMILY MEDICINE | Facility: CLINIC | Age: 24
End: 2019-05-31
Payer: COMMERCIAL

## 2019-05-31 VITALS
HEART RATE: 77 BPM | OXYGEN SATURATION: 99 % | DIASTOLIC BLOOD PRESSURE: 83 MMHG | BODY MASS INDEX: 19.54 KG/M2 | WEIGHT: 121 LBS | SYSTOLIC BLOOD PRESSURE: 129 MMHG | TEMPERATURE: 97.7 F

## 2019-05-31 DIAGNOSIS — Z12.4 PAP SMEAR FOR CERVICAL CANCER SCREENING: Primary | ICD-10-CM

## 2019-05-31 DIAGNOSIS — N94.6 DYSMENORRHEA: ICD-10-CM

## 2019-05-31 RX ORDER — ACETAMINOPHEN AND CODEINE PHOSPHATE 120; 12 MG/5ML; MG/5ML
0.35 SOLUTION ORAL DAILY
Qty: 84 TABLET | Refills: 3 | Status: SHIPPED | OUTPATIENT
Start: 2019-05-31 | End: 2020-04-29

## 2019-05-31 NOTE — NURSING NOTE
24 year old  Chief Complaint   Patient presents with     Gyn Exam       Blood pressure 129/83, pulse 77, temperature 97.7  F (36.5  C), temperature source Oral, weight 54.9 kg (121 lb), last menstrual period 05/17/2019, SpO2 99 %, not currently breastfeeding. Body mass index is 19.54 kg/m .  Patient Active Problem List   Diagnosis     Migraine with aura and without status migrainosus, not intractable     Mild intermittent asthma without complication     Dysmenorrhea     Generalized anxiety disorder     Raynaud's disease       Wt Readings from Last 2 Encounters:   05/31/19 54.9 kg (121 lb)   02/22/19 55 kg (121 lb 4 oz)     BP Readings from Last 3 Encounters:   05/31/19 129/83   02/22/19 122/75   01/08/19 122/77         Current Outpatient Medications   Medication     albuterol (PROAIR HFA/PROVENTIL HFA/VENTOLIN HFA) 108 (90 Base) MCG/ACT inhaler     ibuprofen (ADVIL/MOTRIN) 600 MG tablet     No current facility-administered medications for this visit.        Social History     Tobacco Use     Smoking status: Never Smoker     Smokeless tobacco: Never Used   Substance Use Topics     Alcohol use: No     Alcohol/week: 0.0 oz     Drug use: No       Health Maintenance Due   Topic Date Due     PREVENTIVE CARE VISIT  1997     CHLAMYDIA SCREENING  1997     ASTHMA ACTION PLAN  1997     ASTHMA CONTROL TEST  1997     PAP  1997     HIV SCREENING  02/10/2012       No results found for: PAP      May 31, 2019 2:02 PM

## 2019-05-31 NOTE — PROGRESS NOTES
Tanya Pearson is a 24 year old female here for the following issues:    Dysmenorrhea  Tanya is a 25 yo woman who had been on Miconor OCP in the past but has not been taking it for more than a year.  She reports menses are monthly, lasting about a week. During the first 2-3 days she changes protection 3-4 x per day. She has cramping and headaches.  She uses ES Tylenol  Patient's last menstrual period was 05/17/2019 (approximate).  She wishes to resume the pill.     Pap smear  She is due for her first pap smear. She has never been sexually active. No abnormal vaginal discharge.     Patient Active Problem List   Diagnosis     Migraine with aura and without status migrainosus, not intractable     Mild intermittent asthma without complication     Dysmenorrhea     Generalized anxiety disorder     Raynaud's disease       Current Outpatient Medications   Medication Sig Dispense Refill     albuterol (PROAIR HFA/PROVENTIL HFA/VENTOLIN HFA) 108 (90 Base) MCG/ACT inhaler Inhale 2 puffs into the lungs every 6 hours as needed for shortness of breath / dyspnea or wheezing 1 Inhaler 3     ibuprofen (ADVIL/MOTRIN) 600 MG tablet Take one po q day prn headache.  Needs labs for further refills 30 tablet 0       Allergies   Allergen Reactions     Amoxicillin Rash        EXAM  /83   Pulse 77   Temp 97.7  F (36.5  C) (Oral)   Wt 54.9 kg (121 lb)   LMP 05/17/2019 (Approximate)   SpO2 99%   BMI 19.54 kg/m    Gen: Alert, pleasant, NAD  : External genitalia without lesions, cervix partially visualized, difficult exam due to discomfort.  no abnormal discharge, bimanual exam deferred due to patient discomfort.    Assessment:  (Z12.4) Pap smear for cervical cancer screening  (primary encounter diagnosis)  Comment: first pap smear for this patient  Plan: Pap imaged thin layer screen only - recommended        age 23 - 24 years        Discussed that if pap was adequate, then repeat in 3 yrs, otherwise try again in one   Year.    (N94.6) Dysmenorrhea  Comment: history of heavy menses, painful periods  Plan: norethindrone (MICRONOR) 0.35 MG tablet        Resume micronor OCP, start with next menstrual cycle    Dara Pickett MD  Internal Medicine/Pediatrics

## 2019-06-05 LAB
COPATH REPORT: NORMAL
PAP: NORMAL

## 2019-06-13 ASSESSMENT — ASTHMA QUESTIONNAIRES: ACT_TOTALSCORE: 25

## 2019-07-15 ENCOUNTER — OFFICE VISIT (OUTPATIENT)
Dept: URGENT CARE | Facility: URGENT CARE | Age: 24
End: 2019-07-15
Payer: COMMERCIAL

## 2019-07-15 VITALS
DIASTOLIC BLOOD PRESSURE: 90 MMHG | BODY MASS INDEX: 19.86 KG/M2 | TEMPERATURE: 97.9 F | SYSTOLIC BLOOD PRESSURE: 124 MMHG | WEIGHT: 123 LBS | OXYGEN SATURATION: 99 % | HEART RATE: 86 BPM

## 2019-07-15 DIAGNOSIS — L55.9 SUNBURN: Primary | ICD-10-CM

## 2019-07-15 PROCEDURE — 99213 OFFICE O/P EST LOW 20 MIN: CPT | Performed by: PHYSICIAN ASSISTANT

## 2019-07-15 RX ORDER — SILVER SULFADIAZINE 10 MG/G
CREAM TOPICAL DAILY
Qty: 50 G | Refills: 1 | Status: SHIPPED | OUTPATIENT
Start: 2019-07-15 | End: 2020-04-29

## 2019-07-15 NOTE — LETTER
REPORT OF WORK ABILITY    [facility]   [address], [place]  [address]  [phone]    July 15, 2019    To whom it may concern:  Tanya Pearson has been seen and evaluated today for medical reasons.  Please excuse from work for up to 3 days.         Sincerely,      Bethanie Barrett PA-C

## 2019-07-16 NOTE — PROGRESS NOTES
HPI:  Tanya is a 23 yo female, accompanied by her mother, who presents for sunburn on anterior legs BL, lower back, anterior torso and anterior shoulders x 2 days. [place].  She was wearing sunscreen Painful, but most painful on lower legs and ankles which also has some swelling BL.   No blistering.    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 Also dogs have been jumping on her to play an scratching her sunburned legs.      ROS:  See HPI      PE:  Vitals & nursing notes reviewed. B/P: 124/90, T: 97.9, P: 86, R: Data Unavailable  Constitutional:  Alert, well nourished, well-developed, NAD  Skin:  Erythema to anterior legs BL, area in lower back, anterior shoulders BL, anterior torso.  Skin is mildly warm to touch, but not hot.  No blistering or discharge.    (+) mild edema in ankles BL where sunburn is most TTP.    ASSESSMENT:  (L55.9) Sunburn  (primary encounter diagnosis)  Comment:  No blistering but with some ankle swelling.  May possibly blister later, will likely peel.  Plan: silver sulfADIAZINE (SILVADENE) 1 % external         cream        Silvadene applied in clinic to lower legs which is area most painful for patient.  May also use OTC aloe gel.  Cool compresses or soaks. Elevate legs.  Tylenol or ibuprofen for pain PRN.  Given printout on Sunburn including home cares.  If blistering occurs do not pop blisters.  Clean with mild soap and water.   Avoid sun / keep covered until healed.  Letter for time off work.   F/U with PCP if sx persist or worsen.

## 2019-08-02 ENCOUNTER — TELEPHONE (OUTPATIENT)
Dept: FAMILY MEDICINE | Facility: CLINIC | Age: 24
End: 2019-08-02

## 2019-08-02 NOTE — TELEPHONE ENCOUNTER
Call placed to patient - LVM and advised that since symptoms persist after office visit with [facility] clinic on 7/15/19, patient should be seen sooner than her appointment a week away. Please call back and speak to a nurse, or schedule with one of the available providers on Monday.   Erica Bryant RN  08/02/19  4:17 PM

## 2019-08-09 ENCOUNTER — OFFICE VISIT (OUTPATIENT)
Dept: FAMILY MEDICINE | Facility: CLINIC | Age: 24
End: 2019-08-09
Payer: COMMERCIAL

## 2019-08-09 VITALS
OXYGEN SATURATION: 97 % | DIASTOLIC BLOOD PRESSURE: 72 MMHG | SYSTOLIC BLOOD PRESSURE: 120 MMHG | WEIGHT: 123 LBS | TEMPERATURE: 98.3 F | HEART RATE: 84 BPM | BODY MASS INDEX: 19.77 KG/M2 | HEIGHT: 66 IN

## 2019-08-09 DIAGNOSIS — L55.9 SUNBURN: Primary | ICD-10-CM

## 2019-08-09 DIAGNOSIS — M25.561 ACUTE PAIN OF RIGHT KNEE: ICD-10-CM

## 2019-08-09 ASSESSMENT — MIFFLIN-ST. JEOR: SCORE: 1336.92

## 2019-08-09 NOTE — PROGRESS NOTES
Tanya Pearson is a 24 year old female, accompanied by her mother and sister, here for the following issues:    Sunburn  Tanya presented to  on 7/15/2019 with a sunburn after [place] river on 7[place]13/2019.  She had sunburn over her anterior shins and thighs. Also over her lower back, anterior torso and anterior shoulders. Area was dark red but no blistering.  She was given Silvadine cream and told to use OTC aloe and cool compresses as needed.     Today, she reports no pain, however the redness over her legs persists. It is far less deep red compared to the initial rash but she is concerned redness is not resolved.  She has used the Silvadine cream, and notes it has helped.  She notes her burn never blistered, but did have some peeling.  She has used Tylenol to alleviate any discomfort.    Right Knee Discomfort  Tanya is interested in seeing sports medicine for intermittent right knee pain, which came on abruptly in the past month.  Most of this pain is on the medial side of her right knee.  She can not recall a specific injury, noting she woke up with right knee pain.  She notices this at work often, since she is moving around often.  She has noticed her knee will make a cracking and grinding noise, particularly when walking up and down stairs.  She notes that her right hip will occasionally hurt as well.  No swelling or locking.  NO hx of right knee injuries or similar pain.    Patient Active Problem List   Diagnosis     Migraine with aura and without status migrainosus, not intractable     Mild intermittent asthma without complication     Dysmenorrhea     Generalized anxiety disorder     Raynaud's disease       Current Outpatient Medications   Medication Sig Dispense Refill     albuterol (PROAIR HFA/PROVENTIL HFA/VENTOLIN HFA) 108 (90 Base) MCG/ACT inhaler Inhale 2 puffs into the lungs every 6 hours as needed for shortness of breath / dyspnea or wheezing 1 Inhaler 3     ibuprofen (ADVIL/MOTRIN) 600 MG  "tablet Take one po q day prn headache.  Needs labs for further refills (Patient not taking: Reported on 7/15/2019) 30 tablet 0     norethindrone (MICRONOR) 0.35 MG tablet Take 1 tablet (0.35 mg) by mouth daily 84 tablet 3     silver sulfADIAZINE (SILVADENE) 1 % external cream Apply topically daily 50 g 1       Allergies   Allergen Reactions     Amoxicillin Rash        EXAM  /72 (BP Location: Left arm, Patient Position: Sitting, Cuff Size: Adult Regular)   Pulse 84   Temp 98.3  F (36.8  C) (Oral)   Ht 1.68 m (5' 6.14\")   Wt 55.8 kg (123 lb)   SpO2 97%   BMI 19.77 kg/m    Gen: Alert, pleasant, NAD  Right Knee: mild tenderness with palpation over the medial joint line, no redness, swelling or warmth.   Skin: diffuse confluent erythema over anterior shins which blanches. Less intense redness over anterior thighs    Assessment:  (L55.9) Sunburn  (primary encounter diagnosis)  Comment: improved but still quite red, blanching  Plan: DERMATOLOGY REFERRAL        Suspect it may take some time for skin to completely heal. Gave referral to see dermatologist. Avoid sun exposure, excess heat    (M25.561) Acute pain of right knee  Comment: medial right knee  Plan: refer to walk in clinic today      Dara Pickett MD  Internal Medicine/Pediatrics      I, Angelo Crowder, am serving as a scribe to document services personally performed by Dr. Dara Pickett, based on data collection and the provider's statements to me. Dr. Pickett has reviewed, edited, and approved the above note.   "

## 2019-08-09 NOTE — NURSING NOTE
"24 year old  Chief Complaint   Patient presents with     Derm Problem     bilateral sunburn on legs x4 wks seen in  7/15/2019       Blood pressure 120/72, pulse 84, temperature 98.3  F (36.8  C), temperature source Oral, height 1.68 m (5' 6.14\"), weight 55.8 kg (123 lb), SpO2 97 %, not currently breastfeeding. Body mass index is 19.77 kg/m .  Patient Active Problem List   Diagnosis     Migraine with aura and without status migrainosus, not intractable     Mild intermittent asthma without complication     Dysmenorrhea     Generalized anxiety disorder     Raynaud's disease       Wt Readings from Last 2 Encounters:   08/09/19 55.8 kg (123 lb)   07/15/19 55.8 kg (123 lb)     BP Readings from Last 3 Encounters:   08/09/19 120/72   07/15/19 124/90   05/31/19 129/83         Current Outpatient Medications   Medication     albuterol (PROAIR HFA/PROVENTIL HFA/VENTOLIN HFA) 108 (90 Base) MCG/ACT inhaler     ibuprofen (ADVIL/MOTRIN) 600 MG tablet     norethindrone (MICRONOR) 0.35 MG tablet     silver sulfADIAZINE (SILVADENE) 1 % external cream     No current facility-administered medications for this visit.        Social History     Tobacco Use     Smoking status: Never Smoker     Smokeless tobacco: Never Used   Substance Use Topics     Alcohol use: No     Alcohol/week: 0.0 oz     Drug use: No       Health Maintenance Due   Topic Date Due     PREVENTIVE CARE VISIT  1997     CHLAMYDIA SCREENING  1997     ASTHMA ACTION PLAN  1997     HIV SCREENING  02/10/2012     DTAP/TDAP/TD IMMUNIZATION (7 - Td) 07/30/2019       Lab Results   Component Value Date    PAP NIL 05/31/2019 August 9, 2019 2:53 PM    "

## 2019-10-01 ENCOUNTER — HEALTH MAINTENANCE LETTER (OUTPATIENT)
Age: 24
End: 2019-10-01

## 2020-04-28 DIAGNOSIS — N94.6 DYSMENORRHEA: ICD-10-CM

## 2020-04-28 NOTE — TELEPHONE ENCOUNTER
Last time prescribed: 5/31/19 , 84 tabs/caps x 3 refills  Last office visit: 8/9/19  Next appointment: No Future Appointment Scheduled    Prescription approved per [facility] Refill Protocol.    Will be due for office visit for next refill    Erica Bryant RN  04/29/20  10:21 AM

## 2020-04-29 RX ORDER — ACETAMINOPHEN AND CODEINE PHOSPHATE 120; 12 MG/5ML; MG/5ML
0.35 SOLUTION ORAL DAILY
Qty: 84 TABLET | Refills: 0 | Status: SHIPPED | OUTPATIENT
Start: 2020-04-29 | End: 2020-07-22

## 2020-07-20 DIAGNOSIS — N94.6 DYSMENORRHEA: ICD-10-CM

## 2020-07-20 NOTE — TELEPHONE ENCOUNTER
Last time prescribed: 04/29/2020 , 84 tabs x 0 refills  Last office visit: 08/09/2019  Next appointment: No future appointments    Prescription approved per [facility] Refill Protocol.    Patient notified she will be due for visit for future refills.    Erica Bryant RN  07/22/20  3:26 PM

## 2020-07-22 RX ORDER — ACETAMINOPHEN AND CODEINE PHOSPHATE 120; 12 MG/5ML; MG/5ML
0.35 SOLUTION ORAL DAILY
Qty: 84 TABLET | Refills: 0 | Status: SHIPPED | OUTPATIENT
Start: 2020-07-22 | End: 2020-10-02

## 2020-09-01 ENCOUNTER — TELEPHONE (OUTPATIENT)
Dept: DERMATOLOGY | Facility: CLINIC | Age: 25
End: 2020-09-01

## 2020-09-03 ENCOUNTER — OFFICE VISIT (OUTPATIENT)
Dept: DERMATOLOGY | Facility: CLINIC | Age: 25
End: 2020-09-03
Payer: COMMERCIAL

## 2020-09-03 DIAGNOSIS — L81.4 SOLAR LENTIGINOSIS: Primary | ICD-10-CM

## 2020-09-03 DIAGNOSIS — Z87.2 HISTORY OF SUNBURN: ICD-10-CM

## 2020-09-03 ASSESSMENT — PAIN SCALES - GENERAL: PAINLEVEL: NO PAIN (0)

## 2020-09-03 NOTE — LETTER
9/3/2020       RE: Tanya Pearson  [address] [facility] [facility] [address]     Dear Colleague,    Thank you for referring your patient, Tanya Pearson, to the [facility] DERMATOLOGY at [facility]. Please see a copy of my visit note below.    [facility] Dermatology Note      Dermatology Problem List:  1. Solar lentigines on the legs s/p severe sunburn     Encounter Date: Sep 3, 2020    CC:   Chief Complaint   Patient presents with     Derm Problem     Tanya notes freckles on her legs - bad sunburn 1 year ago.         History of Present Illness:  Ms. Tanya Pearson is a 25 year old female Martino skin type: I who presents for freckling on the legs. She presents with her mom.    Patient states that 1 year ago she got a bad [place] mostly on [place] legs. She had significant peeling of legs. As it started to heal she developed freckling on the legs and would like to hear about cosmetic options to reduce the appearance.  Denies any other new, changing, bleeding, or nonhealing lesions.  Grandmother had skin cancer but unknown type.     She is also interested in lip filler since her upper lip is very thin.    Otherwise feeling well, no additional skin concerns.       Past Medical History:   Patient Active Problem List   Diagnosis     Migraine with aura and without status migrainosus, not intractable     Mild intermittent asthma without complication     Dysmenorrhea     Generalized anxiety disorder     Raynaud's disease     Past Medical History:   Diagnosis Date     Uncomplicated asthma      Past Surgical History:   Procedure Laterality Date     NO HISTORY OF SURGERY         Social History:  Patient reports that she has never smoked. She has never used smokeless tobacco. She reports that she does not drink alcohol or use drugs.    Family History:  Family History   Problem Relation Age of Onset     Cancer  Maternal Grandmother         skin cancer     Breast Cancer Paternal Grandmother 76       Medications:  Current Outpatient Medications   Medication Sig Dispense Refill     albuterol (PROAIR HFA/PROVENTIL HFA/VENTOLIN HFA) 108 (90 Base) MCG/ACT inhaler Inhale 2 puffs into the lungs every 6 hours as needed for shortness of breath / dyspnea or wheezing 1 Inhaler 3     ibuprofen (ADVIL/MOTRIN) 600 MG tablet Take one po q day prn headache.  Needs labs for further refills 30 tablet 0     norethindrone (MICRONOR) 0.35 MG tablet Take 1 tablet (0.35 mg) by mouth daily 84 tablet 0        Allergies   Allergen Reactions     Amoxicillin Rash         Review of Systems:  -As per HPI  -Constitutional: Otherwise feeling well today, in usual state of health.  -Skin: As above in HPI. No additional skin concerns.    Physical exam:  GEN: This is a well developed, well-nourished female in no acute distress, in a pleasant mood.    SKIN: Focused examination of the legs was performed.  -Scattered brown macules diffusely on the legs.  -Reviewed photos which showed significant erythema and desquamation of legs previously.  -No other lesions of concern on areas examined.     Impression/Plan:  1. Solar lentigines- following severe sunburn to the legs.  Patient looking for cosmetic solution. Discussed there may not be anything to do to reduce appearance but can discuss options with cosmetic derm team. Further discussed and emphasized the importance of photoprotection and avoidance of future sunburns.  - Cosmetic derm referral  - ABCDs of melanoma were discussed and self skin checks were advised.   - Sun precaution was advised including the use of sun screens of SPF 30 or higher, sun protective clothing, and avoidance of tanning beds.    2. Filler questions. She also wants to discuss lip filler with cosmetic derm.    Follow-up with cosmetic dermatology prn.      Dr. Ervin staffed the patient.    Staff Involved:  Nora Samaniego,  MD  Dermatology Resident, PGY2    Staff Physician Comments:   I saw and evaluated the patient with the resident and I agree with the assessment and plan.  I was present for the examination.    Germaine Ervin MD    Department of Dermatology  [facility] [place]: Phone: [phone], Fax: [phone]  9/4/2020

## 2020-09-03 NOTE — PATIENT INSTRUCTIONS
Patient Education     Checking for Skin Cancer  You can find cancer early by checking your skin each month. There are 3 kinds of skin cancer. They are melanoma, basal cell carcinoma, and squamous cell carcinoma. Doing monthly skin checks is the best way to find new marks or skin changes. Follow the instructions below for checking your skin.  The ABCDEs of checking moles for melanoma  Check your moles or growths for signs of melanoma using ABCDE:    Asymmetry: the sides of the mole or growth don t match    Border: the edges are ragged, notched, or blurred    Color: the color within the mole or growth varies    Diameter: the mole or growth is larger than 6 mm (size of a pencil eraser)    Evolving: the size, shape, or color of the mole or growth is changing (evolving is not shown in the images below)    Checking for other types of skin cancer  Basal cell carcinoma or squamous cell carcinoma have symptoms such as:      A spot or mole that looks different from all other marks on your skin    Changes in how an area feels, such as itching, tenderness, or pain    Changes in the skin's surface, such as oozing, bleeding, or scaliness    A sore that does not heal    New swelling or redness beyond the border of a mole    Who s at risk?  Anyone can get skin cancer. But you are at greater risk if you have:    Fair skin, light-colored hair, or light-colored eyes    Many moles or abnormal moles on your skin    A history of sunburns from sunlight or tanning beds    A family history of skin cancer    A history of exposure to radiation or chemicals    A weakened immune system  If you have had skin cancer in the past, you are at risk for recurring skin cancer.  How to check your skin  Do your monthly skin checkups in front of a full-length mirror. Check all parts of your body, including your:    Head (ears, face, neck, and scalp)    Torso (front, back, and sides)    Arms (tops, undersides, upper, and lower armpits)    Hands (palms,  backs, and fingers, including under the nails)    Buttocks and genitals    Legs (front, back, and sides)    Feet (tops, soles, toes, including under the nails, and between toes)  If you have a lot of moles, take digital photos of them each month. Make sure to take photos both up close and from a distance. These can help you see if any moles change over time.  Most skin changes are not cancer. But if you see any changes in your skin, call your doctor right away. Only he or she can diagnose a problem. If you have skin cancer, seeing your doctor can be the first step toward getting the treatment that could save your life.  Date Last Reviewed: 4/1/2019 2000-2019 [employer]. [address] [place] [address], [place] [address]. All rights reserved. This information is not intended as a substitute for professional medical care. Always follow your healthcare professional's instructions.           Patient Education     Sunscreens topical dosage forms  What are Sunscreens topical dosage forms?  SUNSCREENS protect your skin from the harmful effects of the sun and help to prevent sunburn. There are 2 different kinds of sunscreens called 'physical sunscreens' and 'chemical sunscreens.' Physical sunscreens reflect the sun's UV radiation. Chemical sunscreens absorb the sun's UV radiation. All physical sunscreens give UVA and UVB protection. All chemical sunscreens give UVB protection. Some chemical sunscreens give both UVA and UVB protection. Limiting the amount of time you spend in the sun and using sunscreens can help prevent wrinkles and skin damage, such as skin cancer.  What should my health care professional know before I receive Sunscreens?  They need to know if you have any of these conditions:    an unusual reaction to sunscreens, PABA, other medicines, foods, dyes, or preservatives    pregnant or trying to get pregnant    breast-feeding  How should this medicine be used?  The sun protection factor (SPF) found on  the product label tells you how much protection a sunscreen offers. Products with high SPFs give more protection against the sun than products with low SPF. Choose a sunscreen product based on the type of activity in which you are involved, your age, site of application, your skin condition, and your skin type. Ask your pharmacist or health care professional about which sunscreen product is best for you.  Sunscreens are for external use only; apply only to the skin. Do not take by mouth. Apply evenly and liberally to all exposed areas of the skin 30 minutes before any sun exposure. Reapply sunscreens every 1--2 hours and after swimming, excessive sweating, or towel drying. Follow the directions on the product label.  Sunscreens are not recommended for infants less than 6 months of age. Infants in this age group should be kept out of the sun. Children and infants who are 6 months of age and older should use sunscreens that contain an SPF of 30 or higher. Contact your pediatrician or health care professional regarding the use of this medicine in children.  Use topical insect repellants containing diethyltoluamide, DEET cautiously while using sunscreens. Sunscreens may increase the absorption of diethyltoluamide, DEET into the skin. This is especially important in children.  What if I miss a dose?  Apply it as soon as you remember.  What drug(s) may interact with Sunscreens?    Estrasorb  topical estrogen emulsion    insect repellants containing diethyltoluamide, DEET  Tell your prescriber or health care professional about all other medicines you are taking, including non-prescription medicines, nutritional supplements, or herbal products. Check with your health care professional before stopping or starting any of your medicines.  What should I watch for while taking Sunscreens?  Do not get sunscreen in your eyes. If you do, rinse out with plenty of cool water.  Minimize your exposure to the sun between the hours of 10  a.m. and 2 p.m. (11 a.m. and 3 p.m. daylight savings time). Be extra careful on cloudy or overcast days and around reflective surfaces such as concrete, sand, snow, or water. You should also wear protective clothing including a hat, long-sleeved shirt, and sunglasses. Avoid sunlamps and tanning beds.  Some sunscreens may discolor and stain light-colored fabrics yellow. Allow sunscreen to dry before covering the area to which the sunscreen was applied.  What side effects may I notice from receiving Sunscreens?  Side effects that you should report to your prescriber or health care professional as soon as possible:    dark red spots on the skin    painful, red, pus-filled blisters in hair follicles  Side effects that usually do not require medical attention (report to your prescriber or health care professional if they continue or are bothersome):    acne    burning or itching of the skin    dry or irritated skin  If you experience skin irritation from a sunscreen you can try a different formulation to prevent the reaction from recurring.  Where can I keep my medicine?  Keep out of reach of children.  Store below 40 degrees C (104 degrees F), preferably at room temperature between 15--30 degrees C (59 and 86 degrees F), unless otherwise specified by the . Store away from heat and direct light. Replace sunscreens yearly to maintain their effectiveness. Discard after expiration date on the bottle.  NOTE:This sheet is a summary. It may not cover all possible information. If you have questions about this medicine, talk to your doctor, pharmacist, or health care provider. Copyright  2016 [employer]

## 2020-09-03 NOTE — NURSING NOTE
Dermatology Rooming Note    Tanya Pearson's goals for this visit include:   Chief Complaint   Patient presents with     Derm Problem     Tanya notes freckles on her legs - bad sunburn 1 year ago.     Dayami Farris, CMA

## 2020-09-03 NOTE — PROGRESS NOTES
[facility] Dermatology Note      Dermatology Problem List:  1. Solar lentigines on the legs s/p severe sunburn     Encounter Date: Sep 3, 2020    CC:   Chief Complaint   Patient presents with     Derm Problem     Tanya notes freckles on her legs - bad sunburn 1 year ago.         History of Present Illness:  Ms. Tanya Pearson is a 25 year old female Martino skin type: I who presents for freckling on the legs. She presents with her mom.    Patient states that 1 year ago she [place]. She had significant peeling of legs. As it started to heal she developed freckling on the legs and would like to hear about cosmetic options to reduce the appearance.  Denies any other new, changing, bleeding, or nonhealing lesions.  Grandmother had skin cancer but unknown type.     She is also interested in lip filler since her upper lip is very thin.    Otherwise feeling well, no additional skin concerns.       Past Medical History:   Patient Active Problem List   Diagnosis     Migraine with aura and without status migrainosus, not intractable     Mild intermittent asthma without complication     Dysmenorrhea     Generalized anxiety disorder     Raynaud's disease     Past Medical History:   Diagnosis Date     Uncomplicated asthma      Past Surgical History:   Procedure Laterality Date     NO HISTORY OF SURGERY         Social History:  Patient reports that she has never smoked. She has never used smokeless tobacco. She reports that she does not drink alcohol or use drugs.    Family History:  Family History   Problem Relation Age of Onset     Cancer Maternal Grandmother         skin cancer     Breast Cancer Paternal Grandmother 76       Medications:  Current Outpatient Medications   Medication Sig Dispense Refill     albuterol (PROAIR HFA/PROVENTIL HFA/VENTOLIN HFA) 108 (90 Base) MCG/ACT inhaler Inhale 2 puffs into the lungs every 6 hours as needed  for shortness of breath / dyspnea or wheezing 1 Inhaler 3     ibuprofen (ADVIL/MOTRIN) 600 MG tablet Take one po q day prn headache.  Needs labs for further refills 30 tablet 0     norethindrone (MICRONOR) 0.35 MG tablet Take 1 tablet (0.35 mg) by mouth daily 84 tablet 0        Allergies   Allergen Reactions     Amoxicillin Rash         Review of Systems:  -As per HPI  -Constitutional: Otherwise feeling well today, in usual state of health.  -Skin: As above in HPI. No additional skin concerns.    Physical exam:  GEN: This is a well developed, well-nourished female in no acute distress, in a pleasant mood.    SKIN: Focused examination of the legs was performed.  -Scattered brown macules diffusely on the legs.  -Reviewed photos which showed significant erythema and desquamation of legs previously.  -No other lesions of concern on areas examined.     Impression/Plan:  1. Solar lentigines- following severe sunburn to the legs.  Patient looking for cosmetic solution. Discussed there may not be anything to do to reduce appearance but can discuss options with cosmetic derm team. Further discussed and emphasized the importance of photoprotection and avoidance of future sunburns.  - Cosmetic derm referral  - ABCDs of melanoma were discussed and self skin checks were advised.   - Sun precaution was advised including the use of sun screens of SPF 30 or higher, sun protective clothing, and avoidance of tanning beds.    2. Filler questions. She also wants to discuss lip filler with cosmetic derm.    Follow-up with cosmetic dermatology prn.      Dr. Ervin staffed the patient.    Staff Involved:  Nora Samaniego MD  Dermatology Resident, PGY2    Staff Physician Comments:   I saw and evaluated the patient with the resident and I agree with the assessment and plan.  I was present for the examination.    Germaine Ervin MD    Department of Dermatology  [facility]  [facility]: Phone: [phone], Fax: [phone]  9/4/2020

## 2020-09-09 ENCOUNTER — OFFICE VISIT (OUTPATIENT)
Dept: FAMILY MEDICINE | Facility: CLINIC | Age: 25
End: 2020-09-09
Payer: COMMERCIAL

## 2020-09-09 VITALS
DIASTOLIC BLOOD PRESSURE: 69 MMHG | BODY MASS INDEX: 20.91 KG/M2 | TEMPERATURE: 98.3 F | WEIGHT: 133.25 LBS | OXYGEN SATURATION: 96 % | SYSTOLIC BLOOD PRESSURE: 107 MMHG | HEART RATE: 85 BPM | HEIGHT: 67 IN

## 2020-09-09 DIAGNOSIS — F43.23 ADJUSTMENT DISORDER WITH MIXED ANXIETY AND DEPRESSED MOOD: Primary | ICD-10-CM

## 2020-09-09 DIAGNOSIS — F41.9 ANXIETY: ICD-10-CM

## 2020-09-09 RX ORDER — FLUOXETINE 10 MG/1
10 CAPSULE ORAL DAILY
Qty: 30 CAPSULE | Refills: 1 | Status: SHIPPED | OUTPATIENT
Start: 2020-09-09 | End: 2020-12-04

## 2020-09-09 ASSESSMENT — ANXIETY QUESTIONNAIRES
2. NOT BEING ABLE TO STOP OR CONTROL WORRYING: MORE THAN HALF THE DAYS
IF YOU CHECKED OFF ANY PROBLEMS ON THIS QUESTIONNAIRE, HOW DIFFICULT HAVE THESE PROBLEMS MADE IT FOR YOU TO DO YOUR WORK, TAKE CARE OF THINGS AT HOME, OR GET ALONG WITH OTHER PEOPLE: SOMEWHAT DIFFICULT
1. FEELING NERVOUS, ANXIOUS, OR ON EDGE: MORE THAN HALF THE DAYS
6. BECOMING EASILY ANNOYED OR IRRITABLE: SEVERAL DAYS
3. WORRYING TOO MUCH ABOUT DIFFERENT THINGS: NEARLY EVERY DAY
5. BEING SO RESTLESS THAT IT IS HARD TO SIT STILL: NOT AT ALL
GAD7 TOTAL SCORE: 12
7. FEELING AFRAID AS IF SOMETHING AWFUL MIGHT HAPPEN: MORE THAN HALF THE DAYS

## 2020-09-09 ASSESSMENT — MIFFLIN-ST. JEOR: SCORE: 1384.66

## 2020-09-09 ASSESSMENT — PATIENT HEALTH QUESTIONNAIRE - PHQ9
5. POOR APPETITE OR OVEREATING: MORE THAN HALF THE DAYS
SUM OF ALL RESPONSES TO PHQ QUESTIONS 1-9: 5

## 2020-09-09 NOTE — PROGRESS NOTES
"Tanya Pearson is a 25 year old female who presents to clinic for the following health issues:    HPI   Abnormal Mood Symptoms  Onset/Duration: Anxiety since childhood, no treatment, no therapy  Description: Situational stressors, Social unrest, her father is a     PHQ 1/15/2019 2/22/2019 9/9/2020   PHQ-9 Total Score 0 0 5   Q9: Thoughts of better off dead/self-harm past 2 weeks Not at all Not at all Not at all     SMITA-7 SCORE 1/15/2019 2/22/2019 9/9/2020   Total Score 0 1 12       Accompanying Signs & Symptoms:  Still participating in activities that you used to enjoy: yes, likes cooking, goes to bars on weekends.   Alcohol consumption, shots 4-6, weekend  Fatigue: mild  Irritability: YES- mild  Difficulty concentrating: no  Changes in appetite: no  Problems with sleep: YES- significant issue  Heart racing/beating fast: occasional  Abnormally elevated, expansive, or irritable mood: no  Persistently increased activity or energy: no  Thoughts of hurting yourself or others: no  History:  Recent stress or major life event: YES- father is a . Social unrest locally and nationally  Prior depression or anxiety: yes, never treated  Family history of depression or anxiety: YES- mother and sister  Alcohol/drug use: YES- binge use of Etoh on weekends  Difficulty sleeping: YES  Precipitating or alleviating factors: None  Therapies tried and outcome: none  PHQ 1/15/2019 2/22/2019 9/9/2020   PHQ-9 Total Score 0 0 5   Q9: Thoughts of better off dead/self-harm past 2 weeks Not at all Not at all Not at all     SMITA-7 SCORE 1/15/2019 2/22/2019 9/9/2020   Total Score 0 1 12       Review of Systems   Constitutional, HEENT, cardiovascular, pulmonary, gi and gu systems are negative, except as otherwise noted.  Vitals:  /69 (BP Location: Right arm, Patient Position: Sitting, Cuff Size: Adult Regular)   Pulse 85   Temp 98.3  F (36.8  C) (Temporal)   Ht 1.69 m (5' 6.54\")   Wt 60.4 kg (133 lb 4 " oz)   LMP 09/01/2020   SpO2 96%   BMI 21.16 kg/m    PSYCH: Alert and oriented times 3; coherent speech, normal   rate and volume, able to articulate logical thoughts, able   to abstract reason, no tangential thoughts, no hallucinations   or delusions  Her affect is anxious  RESP: No cough, no audible wheezing, able to talk in full sentences    Assessment/Plan:  (F43.23) Adjustment disorder with mixed anxiety and depressed mood  (primary encounter diagnosis)  Comment: significant social stressors, she wishes to initiate treatment  Plan: FLUoxetine (PROZAC) 10 MG capsule        Discussed options. I recommend counseling and medication combined. Discussed SSRI, potential side effect and benefits. She wishes to try.  Patient Instructions   Shawn Ullrich, Behavioral health [facility]  Telephone and video    Follow up 4-6 wk  My Chart Message in 2-4 wk to let me know how you are doing.      (F41.9) Anxiety  Comment: longstanding anxiety, lifelong, has never been treated  Plan: FLUoxetine (PROZAC) 10 MG capsule        As above recommend therapy and medication,     Total visit time today 30 minutes.  More than 50% of the time was spent in counseling and coordination of care.      Follow up in 4-6 wks    Dara Pickett MD  Internal Medicine/Pediatrics

## 2020-09-09 NOTE — NURSING NOTE
"25 year old  Chief Complaint   Patient presents with     Anxiety     f/u anxiety        Blood pressure 107/69, pulse 85, temperature 98.3  F (36.8  C), temperature source Temporal, height 1.69 m (5' 6.54\"), weight 60.4 kg (133 lb 4 oz), last menstrual period 09/01/2020, SpO2 96 %, not currently breastfeeding. Body mass index is 21.16 kg/m .  Patient Active Problem List   Diagnosis     Migraine with aura and without status migrainosus, not intractable     Mild intermittent asthma without complication     Dysmenorrhea     Generalized anxiety disorder     Raynaud's disease       Wt Readings from Last 2 Encounters:   09/09/20 60.4 kg (133 lb 4 oz)   08/09/19 55.8 kg (123 lb)     BP Readings from Last 3 Encounters:   09/09/20 107/69   08/09/19 120/72   07/15/19 124/90         Current Outpatient Medications   Medication     albuterol (PROAIR HFA/PROVENTIL HFA/VENTOLIN HFA) 108 (90 Base) MCG/ACT inhaler     ibuprofen (ADVIL/MOTRIN) 600 MG tablet     norethindrone (MICRONOR) 0.35 MG tablet     No current facility-administered medications for this visit.        Social History     Tobacco Use     Smoking status: Never Smoker     Smokeless tobacco: Never Used   Substance Use Topics     Alcohol use: No     Alcohol/week: 0.0 standard drinks     Drug use: No       Health Maintenance Due   Topic Date Due     PREVENTIVE CARE VISIT  1995     CHLAMYDIA SCREENING  1995     ASTHMA ACTION PLAN  1997     HIV SCREENING  02/10/2012     DTAP/TDAP/TD IMMUNIZATION (7 - Td) 07/30/2019     ASTHMA CONTROL TEST  11/30/2019     INFLUENZA VACCINE (1) 09/01/2020       Lab Results   Component Value Date    PAP NIL 05/31/2019 September 9, 2020 11:17 AM  "

## 2020-09-09 NOTE — PATIENT INSTRUCTIONS
Shawn Ullrich, Behavioral health  [facility]  Telephone and video    Follow up 4-6 wk  My Chart Message in 2-4 wk to let me know how you are doing.

## 2020-09-10 ASSESSMENT — ASTHMA QUESTIONNAIRES: ACT_TOTALSCORE: 25

## 2020-09-10 ASSESSMENT — ANXIETY QUESTIONNAIRES: GAD7 TOTAL SCORE: 12

## 2020-09-28 PROBLEM — F43.23 ADJUSTMENT DISORDER WITH MIXED ANXIETY AND DEPRESSED MOOD: Status: ACTIVE | Noted: 2020-09-28

## 2020-10-02 DIAGNOSIS — N94.6 DYSMENORRHEA: ICD-10-CM

## 2020-10-02 RX ORDER — ACETAMINOPHEN AND CODEINE PHOSPHATE 120; 12 MG/5ML; MG/5ML
0.35 SOLUTION ORAL DAILY
Qty: 84 TABLET | Refills: 3 | Status: SHIPPED | OUTPATIENT
Start: 2020-10-02 | End: 2021-09-03

## 2020-10-02 NOTE — TELEPHONE ENCOUNTER
Last time prescribed: 7/22/20 , 84 tabs/caps x 0 refills  Last office visit: 9/9/20  Next appointment: No Future Appointment Scheduled      Prescription approved per G Refill Protocol.    PAP due 5/31/22    Tara Andesron RN  October 2, 2020 8:26 PM

## 2020-12-02 DIAGNOSIS — F41.9 ANXIETY: ICD-10-CM

## 2020-12-02 DIAGNOSIS — F43.23 ADJUSTMENT DISORDER WITH MIXED ANXIETY AND DEPRESSED MOOD: ICD-10-CM

## 2020-12-02 NOTE — TELEPHONE ENCOUNTER
Last time prescribed: 9/9/20 , 30 tabs/caps x 1 refills  Last office visit: 9/9/20  Next appointment: No Future Appointment Scheduled  Last Phq: 9/9/20    Medication is being filled for 1 time refill only due to:  Patient needs to be seen because follow up for new med for her anxiety.  Sending PHQ 9 and SMITA 7 via [employer] , and telling needs f/u visit with Pingel-virtual OK    Routing refill request to provider for review/approval because:  Drug interaction warning--GI bleeding risk     Tara Anderson RN  December 4, 2020 10:49 AM

## 2020-12-04 RX ORDER — FLUOXETINE 10 MG/1
10 CAPSULE ORAL DAILY
Qty: 30 CAPSULE | Refills: 0 | Status: SHIPPED | OUTPATIENT
Start: 2020-12-04 | End: 2021-10-08

## 2020-12-07 ENCOUNTER — TELEPHONE (OUTPATIENT)
Dept: FAMILY MEDICINE | Facility: CLINIC | Age: 25
End: 2020-12-07

## 2020-12-07 NOTE — TELEPHONE ENCOUNTER
----- Message from Dara Pickett MD sent at 12/4/2020 12:47 PM CST -----  Please call Tanya to help schedule virtual visit to follow up anxiety.  20 min should be fine.  I will fill one month of medication    Thanks    Dara Pickett MD  Internal Medicine/Pediatrics

## 2021-01-15 ENCOUNTER — HEALTH MAINTENANCE LETTER (OUTPATIENT)
Age: 26
End: 2021-01-15

## 2021-04-18 ENCOUNTER — HOSPITAL ENCOUNTER (OUTPATIENT)
Facility: CLINIC | Age: 26
Setting detail: OBSERVATION
Discharge: HOME | End: 2021-04-19
Attending: EMERGENCY MEDICINE | Admitting: SURGERY
Payer: COMMERCIAL

## 2021-04-18 ENCOUNTER — APPOINTMENT (OUTPATIENT)
Dept: CT IMAGING | Facility: CLINIC | Age: 26
End: 2021-04-18
Attending: EMERGENCY MEDICINE
Payer: COMMERCIAL

## 2021-04-18 DIAGNOSIS — Z11.52 ENCOUNTER FOR SCREENING LABORATORY TESTING FOR SEVERE ACUTE RESPIRATORY SYNDROME CORONAVIRUS 2 (SARS-COV-2): ICD-10-CM

## 2021-04-18 DIAGNOSIS — K35.80 ACUTE APPENDICITIS, UNSPECIFIED ACUTE APPENDICITIS TYPE: Primary | ICD-10-CM

## 2021-04-18 LAB
ALBUMIN SERPL-MCNC: 4.4 G/DL (ref 3.4–5)
ALBUMIN UR-MCNC: NEGATIVE MG/DL
ALP SERPL-CCNC: 72 U/L (ref 40–150)
ALT SERPL W P-5'-P-CCNC: 23 U/L (ref 0–50)
ANION GAP SERPL CALCULATED.3IONS-SCNC: 9 MMOL/L (ref 3–14)
APPEARANCE UR: CLEAR
AST SERPL W P-5'-P-CCNC: 17 U/L (ref 0–45)
BASOPHILS # BLD AUTO: 0 10E9/L (ref 0–0.2)
BASOPHILS NFR BLD AUTO: 0.2 %
BILIRUB SERPL-MCNC: 1 MG/DL (ref 0.2–1.3)
BILIRUB UR QL STRIP: NEGATIVE
BUN SERPL-MCNC: 16 MG/DL (ref 7–30)
CALCIUM SERPL-MCNC: 9.3 MG/DL (ref 8.5–10.1)
CHLORIDE SERPL-SCNC: 102 MMOL/L (ref 94–109)
CO2 SERPL-SCNC: 25 MMOL/L (ref 20–32)
COLOR UR AUTO: ABNORMAL
CREAT SERPL-MCNC: 0.79 MG/DL (ref 0.52–1.04)
DIFFERENTIAL METHOD BLD: ABNORMAL
EOSINOPHIL # BLD AUTO: 0 10E9/L (ref 0–0.7)
EOSINOPHIL NFR BLD AUTO: 0.1 %
ERYTHROCYTE [DISTWIDTH] IN BLOOD BY AUTOMATED COUNT: 11.7 % (ref 10–15)
GFR SERPL CREATININE-BSD FRML MDRD: >90 ML/MIN/{1.73_M2}
GLUCOSE SERPL-MCNC: 77 MG/DL (ref 70–99)
GLUCOSE UR STRIP-MCNC: NEGATIVE MG/DL
HCG SERPL QL: NEGATIVE
HCT VFR BLD AUTO: 44.3 % (ref 35–47)
HGB BLD-MCNC: 15.1 G/DL (ref 11.7–15.7)
HGB UR QL STRIP: NEGATIVE
IMM GRANULOCYTES # BLD: 0 10E9/L (ref 0–0.4)
IMM GRANULOCYTES NFR BLD: 0.3 %
KETONES UR STRIP-MCNC: 100 MG/DL
LABORATORY COMMENT REPORT: NORMAL
LEUKOCYTE ESTERASE UR QL STRIP: NEGATIVE
LYMPHOCYTES # BLD AUTO: 1.1 10E9/L (ref 0.8–5.3)
LYMPHOCYTES NFR BLD AUTO: 8.5 %
MCH RBC QN AUTO: 31.8 PG (ref 26.5–33)
MCHC RBC AUTO-ENTMCNC: 34.1 G/DL (ref 31.5–36.5)
MCV RBC AUTO: 93 FL (ref 78–100)
MONOCYTES # BLD AUTO: 0.9 10E9/L (ref 0–1.3)
MONOCYTES NFR BLD AUTO: 7.4 %
NEUTROPHILS # BLD AUTO: 10.4 10E9/L (ref 1.6–8.3)
NEUTROPHILS NFR BLD AUTO: 83.5 %
NITRATE UR QL: NEGATIVE
NRBC # BLD AUTO: 0 10*3/UL
NRBC BLD AUTO-RTO: 0 /100
PH UR STRIP: 6 PH (ref 5–7)
PLATELET # BLD AUTO: 196 10E9/L (ref 150–450)
POTASSIUM SERPL-SCNC: 3.6 MMOL/L (ref 3.4–5.3)
PROT SERPL-MCNC: 7.8 G/DL (ref 6.8–8.8)
RADIOLOGIST FLAGS: ABNORMAL
RBC # BLD AUTO: 4.75 10E12/L (ref 3.8–5.2)
RBC #/AREA URNS AUTO: 2 /HPF (ref 0–2)
SARS-COV-2 RNA RESP QL NAA+PROBE: NEGATIVE
SODIUM SERPL-SCNC: 136 MMOL/L (ref 133–144)
SOURCE: ABNORMAL
SP GR UR STRIP: 1.01 (ref 1–1.03)
SPECIMEN SOURCE: NORMAL
SQUAMOUS #/AREA URNS AUTO: 3 /HPF (ref 0–1)
UROBILINOGEN UR STRIP-MCNC: NORMAL MG/DL (ref 0–2)
WBC # BLD AUTO: 12.4 10E9/L (ref 4–11)
WBC #/AREA URNS AUTO: 2 /HPF (ref 0–5)

## 2021-04-18 PROCEDURE — 81001 URINALYSIS AUTO W/SCOPE: CPT | Performed by: EMERGENCY MEDICINE

## 2021-04-18 PROCEDURE — 74177 CT ABD & PELVIS W/CONTRAST: CPT

## 2021-04-18 PROCEDURE — U0003 INFECTIOUS AGENT DETECTION BY NUCLEIC ACID (DNA OR RNA); SEVERE ACUTE RESPIRATORY SYNDROME CORONAVIRUS 2 (SARS-COV-2) (CORONAVIRUS DISEASE [COVID-19]), AMPLIFIED PROBE TECHNIQUE, MAKING USE OF HIGH THROUGHPUT TECHNOLOGIES AS DESCRIBED BY CMS-2020-01-R: HCPCS | Performed by: EMERGENCY MEDICINE

## 2021-04-18 PROCEDURE — 84703 CHORIONIC GONADOTROPIN ASSAY: CPT

## 2021-04-18 PROCEDURE — 96375 TX/PRO/DX INJ NEW DRUG ADDON: CPT | Performed by: EMERGENCY MEDICINE

## 2021-04-18 PROCEDURE — 250N000011 HC RX IP 250 OP 636: Performed by: EMERGENCY MEDICINE

## 2021-04-18 PROCEDURE — 99285 EMERGENCY DEPT VISIT HI MDM: CPT | Performed by: EMERGENCY MEDICINE

## 2021-04-18 PROCEDURE — 258N000003 HC RX IP 258 OP 636: Performed by: EMERGENCY MEDICINE

## 2021-04-18 PROCEDURE — 99285 EMERGENCY DEPT VISIT HI MDM: CPT | Mod: 25 | Performed by: EMERGENCY MEDICINE

## 2021-04-18 PROCEDURE — 96374 THER/PROPH/DIAG INJ IV PUSH: CPT | Mod: 59 | Performed by: EMERGENCY MEDICINE

## 2021-04-18 PROCEDURE — 74177 CT ABD & PELVIS W/CONTRAST: CPT | Mod: 26 | Performed by: RADIOLOGY

## 2021-04-18 PROCEDURE — C9803 HOPD COVID-19 SPEC COLLECT: HCPCS | Performed by: EMERGENCY MEDICINE

## 2021-04-18 PROCEDURE — 96361 HYDRATE IV INFUSION ADD-ON: CPT | Performed by: EMERGENCY MEDICINE

## 2021-04-18 PROCEDURE — 80053 COMPREHEN METABOLIC PANEL: CPT | Performed by: EMERGENCY MEDICINE

## 2021-04-18 PROCEDURE — U0005 INFEC AGEN DETEC AMPLI PROBE: HCPCS | Performed by: EMERGENCY MEDICINE

## 2021-04-18 PROCEDURE — 85025 COMPLETE CBC W/AUTO DIFF WBC: CPT | Performed by: EMERGENCY MEDICINE

## 2021-04-18 RX ORDER — IOPAMIDOL 755 MG/ML
86 INJECTION, SOLUTION INTRAVASCULAR ONCE
Status: COMPLETED | OUTPATIENT
Start: 2021-04-18 | End: 2021-04-18

## 2021-04-18 RX ORDER — ONDANSETRON 2 MG/ML
4 INJECTION INTRAMUSCULAR; INTRAVENOUS EVERY 30 MIN PRN
Status: DISCONTINUED | OUTPATIENT
Start: 2021-04-18 | End: 2021-04-19 | Stop reason: HOSPADM

## 2021-04-18 RX ORDER — HYDROMORPHONE HYDROCHLORIDE 1 MG/ML
0.5 INJECTION, SOLUTION INTRAMUSCULAR; INTRAVENOUS; SUBCUTANEOUS
Status: COMPLETED | OUTPATIENT
Start: 2021-04-18 | End: 2021-04-18

## 2021-04-18 RX ADMIN — ONDANSETRON 4 MG: 2 INJECTION INTRAMUSCULAR; INTRAVENOUS at 21:02

## 2021-04-18 RX ADMIN — IOPAMIDOL 86 ML: 755 INJECTION, SOLUTION INTRAVENOUS at 20:30

## 2021-04-18 RX ADMIN — HYDROMORPHONE HYDROCHLORIDE 0.5 MG: 1 INJECTION, SOLUTION INTRAMUSCULAR; INTRAVENOUS; SUBCUTANEOUS at 19:03

## 2021-04-18 RX ADMIN — ONDANSETRON 4 MG: 2 INJECTION INTRAMUSCULAR; INTRAVENOUS at 21:30

## 2021-04-18 RX ADMIN — SODIUM CHLORIDE 1000 ML: 9 INJECTION, SOLUTION INTRAVENOUS at 19:03

## 2021-04-18 NOTE — ED TRIAGE NOTES
Pt presents ambulatory to triage with severe RLQ pain. Pt states pain started a few hrs ago. Pt denies urinary sx or the chance of pregnancy. Pt states pain was 10/10 and took tylenol, now 8/10. Severe pain on palpation by RN.    Pt is VSS in triage.

## 2021-04-19 ENCOUNTER — PATIENT OUTREACH (OUTPATIENT)
Dept: CARE COORDINATION | Facility: CLINIC | Age: 26
End: 2021-04-19

## 2021-04-19 ENCOUNTER — ANESTHESIA EVENT (OUTPATIENT)
Dept: SURGERY | Facility: CLINIC | Age: 26
End: 2021-04-19
Payer: COMMERCIAL

## 2021-04-19 ENCOUNTER — ANESTHESIA (OUTPATIENT)
Dept: SURGERY | Facility: CLINIC | Age: 26
End: 2021-04-19
Payer: COMMERCIAL

## 2021-04-19 VITALS
HEIGHT: 66 IN | TEMPERATURE: 98.1 F | BODY MASS INDEX: 22.04 KG/M2 | WEIGHT: 137.13 LBS | OXYGEN SATURATION: 99 % | RESPIRATION RATE: 18 BRPM | HEART RATE: 85 BPM | DIASTOLIC BLOOD PRESSURE: 74 MMHG | SYSTOLIC BLOOD PRESSURE: 112 MMHG

## 2021-04-19 LAB — LACTATE BLD-SCNC: 0.7 MMOL/L (ref 0.7–2)

## 2021-04-19 PROCEDURE — 370N000017 HC ANESTHESIA TECHNICAL FEE, PER MIN: Performed by: SURGERY

## 2021-04-19 PROCEDURE — 250N000024 HC ISOFLURANE, PER MIN: Performed by: SURGERY

## 2021-04-19 PROCEDURE — G0378 HOSPITAL OBSERVATION PER HR: HCPCS

## 2021-04-19 PROCEDURE — 360N000076 HC SURGERY LEVEL 3, PER MIN: Performed by: SURGERY

## 2021-04-19 PROCEDURE — 88342 IMHCHEM/IMCYTCHM 1ST ANTB: CPT | Mod: TC | Performed by: SURGERY

## 2021-04-19 PROCEDURE — 88341 IMHCHEM/IMCYTCHM EA ADD ANTB: CPT | Mod: 26 | Performed by: PATHOLOGY

## 2021-04-19 PROCEDURE — 250N000013 HC RX MED GY IP 250 OP 250 PS 637: Performed by: SURGERY

## 2021-04-19 PROCEDURE — 272N000001 HC OR GENERAL SUPPLY STERILE: Performed by: SURGERY

## 2021-04-19 PROCEDURE — 88341 IMHCHEM/IMCYTCHM EA ADD ANTB: CPT | Mod: TC | Performed by: SURGERY

## 2021-04-19 PROCEDURE — 250N000009 HC RX 250: Performed by: NURSE ANESTHETIST, CERTIFIED REGISTERED

## 2021-04-19 PROCEDURE — 250N000011 HC RX IP 250 OP 636: Performed by: NURSE ANESTHETIST, CERTIFIED REGISTERED

## 2021-04-19 PROCEDURE — 88342 IMHCHEM/IMCYTCHM 1ST ANTB: CPT | Mod: 26 | Performed by: PATHOLOGY

## 2021-04-19 PROCEDURE — 710N000009 HC RECOVERY PHASE 1, LEVEL 1, PER MIN: Performed by: SURGERY

## 2021-04-19 PROCEDURE — 88304 TISSUE EXAM BY PATHOLOGIST: CPT | Mod: TC | Performed by: SURGERY

## 2021-04-19 PROCEDURE — 36415 COLL VENOUS BLD VENIPUNCTURE: CPT | Performed by: SURGERY

## 2021-04-19 PROCEDURE — 999N000141 HC STATISTIC PRE-PROCEDURE NURSING ASSESSMENT: Performed by: SURGERY

## 2021-04-19 PROCEDURE — 88304 TISSUE EXAM BY PATHOLOGIST: CPT | Mod: 26 | Performed by: PATHOLOGY

## 2021-04-19 PROCEDURE — 250N000009 HC RX 250: Performed by: SURGERY

## 2021-04-19 PROCEDURE — 258N000003 HC RX IP 258 OP 636: Performed by: NURSE ANESTHETIST, CERTIFIED REGISTERED

## 2021-04-19 PROCEDURE — 83605 ASSAY OF LACTIC ACID: CPT | Performed by: SURGERY

## 2021-04-19 PROCEDURE — 250N000011 HC RX IP 250 OP 636

## 2021-04-19 RX ORDER — ONDANSETRON 4 MG/1
4 TABLET, ORALLY DISINTEGRATING ORAL
Status: DISCONTINUED | OUTPATIENT
Start: 2021-04-19 | End: 2021-04-19 | Stop reason: HOSPADM

## 2021-04-19 RX ORDER — OXYCODONE HYDROCHLORIDE 5 MG/1
5-10 TABLET ORAL EVERY 4 HOURS PRN
Qty: 10 TABLET | Refills: 0 | Status: SHIPPED | OUTPATIENT
Start: 2021-04-19 | End: 2021-10-08

## 2021-04-19 RX ORDER — ONDANSETRON 2 MG/ML
INJECTION INTRAMUSCULAR; INTRAVENOUS PRN
Status: DISCONTINUED | OUTPATIENT
Start: 2021-04-19 | End: 2021-04-19

## 2021-04-19 RX ORDER — ONDANSETRON 4 MG/1
4 TABLET, ORALLY DISINTEGRATING ORAL EVERY 6 HOURS PRN
Status: DISCONTINUED | OUTPATIENT
Start: 2021-04-19 | End: 2021-04-19 | Stop reason: HOSPADM

## 2021-04-19 RX ORDER — LIDOCAINE 40 MG/G
CREAM TOPICAL
Status: DISCONTINUED | OUTPATIENT
Start: 2021-04-19 | End: 2021-04-19 | Stop reason: HOSPADM

## 2021-04-19 RX ORDER — OXYCODONE HYDROCHLORIDE 5 MG/1
5-10 TABLET ORAL EVERY 4 HOURS PRN
Status: DISCONTINUED | OUTPATIENT
Start: 2021-04-19 | End: 2021-04-19 | Stop reason: HOSPADM

## 2021-04-19 RX ORDER — ESMOLOL HYDROCHLORIDE 10 MG/ML
INJECTION INTRAVENOUS PRN
Status: DISCONTINUED | OUTPATIENT
Start: 2021-04-19 | End: 2021-04-19

## 2021-04-19 RX ORDER — ERTAPENEM 1 G/1
1 INJECTION, POWDER, LYOPHILIZED, FOR SOLUTION INTRAMUSCULAR; INTRAVENOUS EVERY 24 HOURS
Status: DISCONTINUED | OUTPATIENT
Start: 2021-04-19 | End: 2021-04-19 | Stop reason: HOSPADM

## 2021-04-19 RX ORDER — METHOCARBAMOL 750 MG/1
750 TABLET, FILM COATED ORAL
Status: DISCONTINUED | OUTPATIENT
Start: 2021-04-19 | End: 2021-04-19 | Stop reason: HOSPADM

## 2021-04-19 RX ORDER — ACETAMINOPHEN 325 MG/1
975 TABLET ORAL
Status: DISCONTINUED | OUTPATIENT
Start: 2021-04-19 | End: 2021-04-19 | Stop reason: HOSPADM

## 2021-04-19 RX ORDER — FENTANYL CITRATE 50 UG/ML
INJECTION, SOLUTION INTRAMUSCULAR; INTRAVENOUS PRN
Status: DISCONTINUED | OUTPATIENT
Start: 2021-04-19 | End: 2021-04-19

## 2021-04-19 RX ORDER — PROPOFOL 10 MG/ML
INJECTION, EMULSION INTRAVENOUS PRN
Status: DISCONTINUED | OUTPATIENT
Start: 2021-04-19 | End: 2021-04-19

## 2021-04-19 RX ORDER — ONDANSETRON 2 MG/ML
4 INJECTION INTRAMUSCULAR; INTRAVENOUS EVERY 6 HOURS PRN
Status: DISCONTINUED | OUTPATIENT
Start: 2021-04-19 | End: 2021-04-19 | Stop reason: HOSPADM

## 2021-04-19 RX ORDER — IBUPROFEN 600 MG/1
600 TABLET, FILM COATED ORAL EVERY 6 HOURS PRN
Qty: 30 TABLET | Refills: 0 | Status: SHIPPED | OUTPATIENT
Start: 2021-04-19 | End: 2022-10-07

## 2021-04-19 RX ORDER — HYDROMORPHONE HCL IN WATER/PF 6 MG/30 ML
0.2 PATIENT CONTROLLED ANALGESIA SYRINGE INTRAVENOUS
Status: DISCONTINUED | OUTPATIENT
Start: 2021-04-19 | End: 2021-04-19 | Stop reason: HOSPADM

## 2021-04-19 RX ORDER — SODIUM CHLORIDE, SODIUM LACTATE, POTASSIUM CHLORIDE, CALCIUM CHLORIDE 600; 310; 30; 20 MG/100ML; MG/100ML; MG/100ML; MG/100ML
INJECTION, SOLUTION INTRAVENOUS CONTINUOUS PRN
Status: DISCONTINUED | OUTPATIENT
Start: 2021-04-19 | End: 2021-04-19

## 2021-04-19 RX ORDER — PROCHLORPERAZINE MALEATE 10 MG
10 TABLET ORAL EVERY 6 HOURS PRN
Status: DISCONTINUED | OUTPATIENT
Start: 2021-04-19 | End: 2021-04-19 | Stop reason: HOSPADM

## 2021-04-19 RX ORDER — ACETAMINOPHEN 325 MG/1
650 TABLET ORAL EVERY 4 HOURS PRN
Status: DISCONTINUED | OUTPATIENT
Start: 2021-04-19 | End: 2021-04-19 | Stop reason: HOSPADM

## 2021-04-19 RX ORDER — LIDOCAINE HYDROCHLORIDE 20 MG/ML
INJECTION, SOLUTION INFILTRATION; PERINEURAL PRN
Status: DISCONTINUED | OUTPATIENT
Start: 2021-04-19 | End: 2021-04-19

## 2021-04-19 RX ORDER — BUPIVACAINE HYDROCHLORIDE AND EPINEPHRINE 5; 5 MG/ML; UG/ML
INJECTION, SOLUTION PERINEURAL PRN
Status: DISCONTINUED | OUTPATIENT
Start: 2021-04-19 | End: 2021-04-19 | Stop reason: HOSPADM

## 2021-04-19 RX ORDER — OXYCODONE HYDROCHLORIDE 10 MG/1
10 TABLET ORAL
Status: DISCONTINUED | OUTPATIENT
Start: 2021-04-19 | End: 2021-04-19 | Stop reason: HOSPADM

## 2021-04-19 RX ORDER — ACETAMINOPHEN 325 MG/1
975 TABLET ORAL EVERY 6 HOURS
Qty: 36 TABLET | Refills: 0 | Status: SHIPPED | OUTPATIENT
Start: 2021-04-19 | End: 2021-04-22

## 2021-04-19 RX ORDER — KETOROLAC TROMETHAMINE 30 MG/ML
INJECTION, SOLUTION INTRAMUSCULAR; INTRAVENOUS PRN
Status: DISCONTINUED | OUTPATIENT
Start: 2021-04-19 | End: 2021-04-19

## 2021-04-19 RX ORDER — DEXAMETHASONE SODIUM PHOSPHATE 4 MG/ML
INJECTION, SOLUTION INTRA-ARTICULAR; INTRALESIONAL; INTRAMUSCULAR; INTRAVENOUS; SOFT TISSUE PRN
Status: DISCONTINUED | OUTPATIENT
Start: 2021-04-19 | End: 2021-04-19

## 2021-04-19 RX ORDER — SENNA AND DOCUSATE SODIUM 50; 8.6 MG/1; MG/1
1 TABLET, FILM COATED ORAL 2 TIMES DAILY
Qty: 30 TABLET | Refills: 0 | Status: SHIPPED | OUTPATIENT
Start: 2021-04-19 | End: 2021-10-08

## 2021-04-19 RX ADMIN — FENTANYL CITRATE 100 MCG: 50 INJECTION, SOLUTION INTRAMUSCULAR; INTRAVENOUS at 12:15

## 2021-04-19 RX ADMIN — ESMOLOL HYDROCHLORIDE 40 MG: 10 INJECTION, SOLUTION INTRAVENOUS at 12:39

## 2021-04-19 RX ADMIN — LIDOCAINE HYDROCHLORIDE 40 MG: 20 INJECTION, SOLUTION INFILTRATION; PERINEURAL at 13:12

## 2021-04-19 RX ADMIN — ACETAMINOPHEN 975 MG: 325 TABLET, FILM COATED ORAL at 16:17

## 2021-04-19 RX ADMIN — PROPOFOL 10 MG: 10 INJECTION, EMULSION INTRAVENOUS at 12:38

## 2021-04-19 RX ADMIN — LIDOCAINE HYDROCHLORIDE 60 MG: 20 INJECTION, SOLUTION INFILTRATION; PERINEURAL at 12:20

## 2021-04-19 RX ADMIN — ONDANSETRON 4 MG: 2 INJECTION INTRAMUSCULAR; INTRAVENOUS at 12:58

## 2021-04-19 RX ADMIN — PROPOFOL 10 MG: 10 INJECTION, EMULSION INTRAVENOUS at 12:59

## 2021-04-19 RX ADMIN — SUGAMMADEX 100 MG: 100 INJECTION, SOLUTION INTRAVENOUS at 13:05

## 2021-04-19 RX ADMIN — ERTAPENEM SODIUM 1 G: 1 INJECTION, POWDER, LYOPHILIZED, FOR SOLUTION INTRAMUSCULAR; INTRAVENOUS at 09:51

## 2021-04-19 RX ADMIN — KETOROLAC TROMETHAMINE 30 MG: 30 INJECTION, SOLUTION INTRAMUSCULAR at 13:06

## 2021-04-19 RX ADMIN — PROPOFOL 150 MG: 10 INJECTION, EMULSION INTRAVENOUS at 12:20

## 2021-04-19 RX ADMIN — ROCURONIUM BROMIDE 60 MG: 10 INJECTION INTRAVENOUS at 12:20

## 2021-04-19 RX ADMIN — ESMOLOL HYDROCHLORIDE 60 MG: 10 INJECTION, SOLUTION INTRAVENOUS at 12:20

## 2021-04-19 RX ADMIN — PROPOFOL 10 MG: 10 INJECTION, EMULSION INTRAVENOUS at 13:07

## 2021-04-19 RX ADMIN — DEXAMETHASONE SODIUM PHOSPHATE 4 MG: 4 INJECTION, SOLUTION INTRA-ARTICULAR; INTRALESIONAL; INTRAMUSCULAR; INTRAVENOUS; SOFT TISSUE at 12:31

## 2021-04-19 RX ADMIN — HYDROMORPHONE HYDROCHLORIDE 0.5 MG: 1 INJECTION, SOLUTION INTRAMUSCULAR; INTRAVENOUS; SUBCUTANEOUS at 12:38

## 2021-04-19 RX ADMIN — SODIUM CHLORIDE, POTASSIUM CHLORIDE, SODIUM LACTATE AND CALCIUM CHLORIDE: 600; 310; 30; 20 INJECTION, SOLUTION INTRAVENOUS at 12:17

## 2021-04-19 RX ADMIN — MIDAZOLAM 2 MG: 1 INJECTION INTRAMUSCULAR; INTRAVENOUS at 12:09

## 2021-04-19 RX ADMIN — SUGAMMADEX 200 MG: 100 INJECTION, SOLUTION INTRAVENOUS at 13:03

## 2021-04-19 RX ADMIN — PROPOFOL 10 MG: 10 INJECTION, EMULSION INTRAVENOUS at 12:45

## 2021-04-19 ASSESSMENT — MIFFLIN-ST. JEOR: SCORE: 1388.75

## 2021-04-19 NOTE — ANESTHESIA POSTPROCEDURE EVALUATION
Patient: Tanya Bermanland    Procedure(s):  APPENDECTOMY, LAPAROSCOPIC    Diagnosis:Appendicitis [K37]  Diagnosis Additional Information: No value filed.    Anesthesia Type:  General    Note:  Disposition: Inpatient   Postop Pain Control: Uneventful            Sign Out: Well controlled pain   PONV: No   Neuro/Psych: Uneventful            Sign Out: Acceptable/Baseline neuro status   Airway/Respiratory: Uneventful            Sign Out: Acceptable/Baseline resp. status   CV/Hemodynamics: Uneventful            Sign Out: Acceptable CV status   Other NRE: NONE   DID A NON-ROUTINE EVENT OCCUR? No         Last vitals:  Vitals:    04/19/21 1445 04/19/21 1505 04/19/21 1519   BP: 108/62 112/74    Pulse: 76 85    Resp: (!) 33 18    Temp:  36.7  C (98.1  F)    SpO2: 97% 99% 99%       Last vitals prior to Anesthesia Care Transfer:  CRNA VITALS  4/19/2021 1246 - 4/19/2021 1346      4/19/2021             EKG:  Sinus rhythm          Electronically Signed By: Larry Meneses DO  April 19, 2021  4:12 PM

## 2021-04-19 NOTE — DISCHARGE INSTRUCTIONS
Shower daily.  Allow warm soapy water to rinse over the incision.  Do not scrub.  Avoid oil based lotions  Do not soak in tub/bath/pool for 2 weeks        If you experience any of the following, please go to the ED to be evaluated;     Foul smelling drainage  Warm, red, painful skin extending from the incision  Leg swelling/pain  Shortness of breath/difficulty breathing  Numbness/weakness of arms/legs  Facial drooping

## 2021-04-19 NOTE — ED PROVIDER NOTES
ED Provider Note  [facility]      History     Chief Complaint   Patient presents with     Abdominal Pain     RLQ     HPI  Tanya Pearson is a 26 year old female without significant past medical history who presents to the Emergency Department for evaluation of severe RLQ abdominal pain that started few hours prior to arrival.  She states she was laying in bed this afternoon when she noticed pain around her umbilicus.  The pain became more severe and migrated to her right lower quadrant.  She did have some nausea with this.  No aggravating or alleviating factors.  She describes the pain as sharp and stabbing.  No fevers, chills, vomiting, or diarrhea.  No dysuria or hematuria.  Last menstrual period was about 2 months ago (this is normal as she is on birth control and her period is irregular).  No prior history of abdominal surgeries.    Past Medical History  Past Medical History:   Diagnosis Date     Uncomplicated asthma      Past Surgical History:   Procedure Laterality Date     NO HISTORY OF SURGERY       albuterol (PROAIR HFA/PROVENTIL HFA/VENTOLIN HFA) 108 (90 Base) MCG/ACT inhaler  FLUoxetine (PROZAC) 10 MG capsule  ibuprofen (ADVIL/MOTRIN) 600 MG tablet  norethindrone (MICRONOR) 0.35 MG tablet      Allergies   Allergen Reactions     Amoxicillin Rash     Family History  Family History   Problem Relation Age of Onset     Cancer Maternal Grandmother         skin cancer     Breast Cancer Paternal Grandmother 76     Social History   Social History     Tobacco Use     Smoking status: Never Smoker     Smokeless tobacco: Never Used   Substance Use Topics     Alcohol use: No     Alcohol/week: 0.0 standard drinks     Drug use: No      Past medical history, past surgical history, medications, allergies, family history, and social history were reviewed with the patient. No additional pertinent items.       Review of Systems  A complete review of systems was performed with pertinent  positives and negatives noted in the HPI, and all other systems negative.    Physical Exam   BP: 129/78  Pulse: 92  Temp: 97.9  F (36.6  C)  Resp: 18  Weight: 63.5 kg (140 lb)  SpO2: 99 %  Physical Exam  Vitals signs and nursing note reviewed.   Constitutional:       General: She is not in acute distress.     Appearance: She is well-developed. She is not diaphoretic.   HENT:      Head: Normocephalic and atraumatic.      Mouth/Throat:      Pharynx: No oropharyngeal exudate.   Eyes:      General: No scleral icterus.     Pupils: Pupils are equal, round, and reactive to light.   Cardiovascular:      Rate and Rhythm: Normal rate and regular rhythm.      Heart sounds: Normal heart sounds.   Pulmonary:      Effort: No respiratory distress.      Breath sounds: Normal breath sounds.   Abdominal:      General: Bowel sounds are normal.      Palpations: Abdomen is soft.      Tenderness: There is abdominal tenderness in the right lower quadrant. There is no guarding or rebound.   Musculoskeletal:         General: No tenderness.   Skin:     General: Skin is warm.      Capillary Refill: Capillary refill takes less than 2 seconds.      Findings: No rash.   Neurological:      General: No focal deficit present.      Mental Status: She is alert and oriented to person, place, and time.         ED Course      Procedures         Results for orders placed or performed during the hospital encounter of 04/18/21   CT Abdomen Pelvis w Contrast     Status: Abnormal   Result Value Ref Range    Radiologist flags Uncomplicated appendicitis (Urgent)     Narrative    EXAMINATION: CT ABDOMEN PELVIS W CONTRAST, 4/18/2021 8:38 PM    TECHNIQUE:  Helical CT images from the lung bases through the  symphysis pubis were obtained with contrast.  Coronal reformatted  images were generated at a workstation for further assessment.    CONTRAST:  86 cc Isovue 370 IV.    COMPARISON: None.    HISTORY: RLQ abdominal pain, appendicitis suspected (Age >=  14y)    FINDINGS:    Abdomen and pelvis:   Liver: No suspicious liver lesions. Portal veins appear patent.  Gallbladder: No gallstones. No evidence of acute cholecystitis.  Spleen: Normal size.  Pancreas: No suspicious pancreatic lesions. The pancreatic duct is not  dilated.  Adrenal glands: No adrenal nodules.  Kidneys: No kidney masses. No hydronephrosis or obstructing renal  stones.  Bladder / Pelvic organs: 2.5 x 2.1 cm left adnexal cyst.  Bowel: No bowel wall thickening. No dilated loops of small or large  bowel. The appendix is mildly dilated, measuring 8 mm in diameter.  There is wall thickening and hyperenhancement of the appendix. There  are mild periappendiceal inflammatory changes.  Lymph nodes: No retroperitoneal, mesenteric, or pelvic  lymphadenopathy.  Fluid: Small amount of free fluid within the pelvis. No free air.   Vessels: No infrarenal aortic aneurysm.     Lung bases: No consolidation or pleural effusion.    Bones and soft tissues: No suspicious osseous lesions. Small  fat-containing periumbilical hernia.      Impression    IMPRESSION:   1. The appendix is mildly dilated with wall thickening and enhancement  and mild periappendiceal inflammatory changes, indicating appendicitis  without evidence of perforation or abscess.  2. Small amount of free fluid in the pelvis, possibly physiologic.    [Urgent Result: Uncomplicated appendicitis]    Finding was identified on 4/18/2021 9:35 PM.     The ED provider, Dr. Segovia was contacted by Dr. Erendira Pagan at  4/18/2021 9:45 PM and verbalized understanding of the urgent finding.     I have personally reviewed the examination and initial interpretation  and I agree with the findings.    EDDIE NOBLE MD   Comprehensive metabolic panel     Status: None   Result Value Ref Range    Sodium 136 133 - 144 mmol/L    Potassium 3.6 3.4 - 5.3 mmol/L    Chloride 102 94 - 109 mmol/L    Carbon Dioxide 25 20 - 32 mmol/L    Anion Gap 9 3 - 14 mmol/L    Glucose 77 70  - 99 mg/dL    Urea Nitrogen 16 7 - 30 mg/dL    Creatinine 0.79 0.52 - 1.04 mg/dL    GFR Estimate >90 >60 mL/min/[1.73_m2]    GFR Estimate If Black >90 >60 mL/min/[1.73_m2]    Calcium 9.3 8.5 - 10.1 mg/dL    Bilirubin Total 1.0 0.2 - 1.3 mg/dL    Albumin 4.4 3.4 - 5.0 g/dL    Protein Total 7.8 6.8 - 8.8 g/dL    Alkaline Phosphatase 72 40 - 150 U/L    ALT 23 0 - 50 U/L    AST 17 0 - 45 U/L   CBC with platelets differential     Status: Abnormal   Result Value Ref Range    WBC 12.4 (H) 4.0 - 11.0 10e9/L    RBC Count 4.75 3.8 - 5.2 10e12/L    Hemoglobin 15.1 11.7 - 15.7 g/dL    Hematocrit 44.3 35.0 - 47.0 %    MCV 93 78 - 100 fl    MCH 31.8 26.5 - 33.0 pg    MCHC 34.1 31.5 - 36.5 g/dL    RDW 11.7 10.0 - 15.0 %    Platelet Count 196 150 - 450 10e9/L    Diff Method Automated Method     % Neutrophils 83.5 %    % Lymphocytes 8.5 %    % Monocytes 7.4 %    % Eosinophils 0.1 %    % Basophils 0.2 %    % Immature Granulocytes 0.3 %    Nucleated RBCs 0 0 /100    Absolute Neutrophil 10.4 (H) 1.6 - 8.3 10e9/L    Absolute Lymphocytes 1.1 0.8 - 5.3 10e9/L    Absolute Monocytes 0.9 0.0 - 1.3 10e9/L    Absolute Eosinophils 0.0 0.0 - 0.7 10e9/L    Absolute Basophils 0.0 0.0 - 0.2 10e9/L    Abs Immature Granulocytes 0.0 0 - 0.4 10e9/L    Absolute Nucleated RBC 0.0    UA with Microscopic reflex to Culture     Status: Abnormal    Specimen: Midstream Urine   Result Value Ref Range    Color Urine Light Yellow     Appearance Urine Clear     Glucose Urine Negative NEG^Negative mg/dL    Bilirubin Urine Negative NEG^Negative    Ketones Urine 100 (A) NEG^Negative mg/dL    Specific Gravity Urine 1.015 1.003 - 1.035    Blood Urine Negative NEG^Negative    pH Urine 6.0 5.0 - 7.0 pH    Protein Albumin Urine Negative NEG^Negative mg/dL    Urobilinogen mg/dL Normal 0.0 - 2.0 mg/dL    Nitrite Urine Negative NEG^Negative    Leukocyte Esterase Urine Negative NEG^Negative    Source Midstream Urine     WBC Urine 2 0 - 5 /HPF    RBC Urine 2 0 - 2 /HPF     Squamous Epithelial /HPF Urine 3 (H) 0 - 1 /HPF   HCG qualitative     Status: None   Result Value Ref Range    HCG Qualitative Serum Negative NEG^Negative   Asymptomatic SARS-CoV-2 COVID-19 Virus (Coronavirus) by PCR     Status: None    Specimen: Nasopharyngeal   Result Value Ref Range    SARS-CoV-2 Virus Specimen Source Nasopharyngeal     SARS-CoV-2 PCR Result NEGATIVE     SARS-CoV-2 PCR Comment       Testing was performed using the Xpert Xpress SARS-CoV-2 Assay on the Cepheid Gene-Xpert   Instrument Systems. Additional information about this Emergency Use Authorization (EUA)   assay can be found via the Lab Guide.       Medications   ondansetron (ZOFRAN) injection 4 mg (4 mg Intravenous Given 4/18/21 2130)   0.9% sodium chloride BOLUS (0 mLs Intravenous Stopped 4/18/21 2102)   HYDROmorphone (PF) (DILAUDID) injection 0.5 mg (0.5 mg Intravenous Given 4/18/21 1903)   iopamidol (ISOVUE-370) solution 86 mL (86 mLs Intravenous Given 4/18/21 2030)   sodium chloride (PF) 0.9% PF flush 72 mL (72 mLs Intravenous Given 4/18/21 2032)        Assessments & Plan (with Medical Decision Making)   Patient was seen and examined.  Findings are certainly concerning for acute appendicitis.  Ovarian pathology is also on the differential.  Patient had labs ordered which show a mild leukocytosis.  Otherwise unremarkable.  CT abdomen and pelvis was ordered which shows evidence of early uncomplicated appendicitis.  I discussed the case with general surgery who will admit the patient for appendectomy in the morning.  Patient's pain is well controlled with small doses of IV Dilaudid.  She is receiving fluids.  She was admitted in stable condition.    I have reviewed the nursing notes. I have reviewed the findings, diagnosis, plan and need for follow up with the patient.    New Prescriptions    No medications on file       Final diagnoses:   Acute appendicitis, unspecified acute appendicitis type       --  Christina Segovia DO  [facility] EMERGENCY DEPARTMENT  4/18/2021     Christina Segovia DO  04/19/21 0143

## 2021-04-19 NOTE — CONSULTS
[facility] Surgery Consultation    Tanya Pearson MRN# [id number]   Age: 26 year old YOB: 1995     Date of Admission:  4/18/2021    Date of Consult:   4/18/21    Reason for consult: Appendicitis       Requesting service: ED; requesting provider: Dr Segovia                   Assessment and Plan:   Assessment:   Ms Pearson is a 25 yo woman with no significant pmh presenting with uncomplicated appendicitis.         Plan:   Admit to observation  Ertapenem  Pain control  Add-on laparoscopic appendectomy tomorrow with Dr. Vazquez  NPO after midnight    Discussed with chief resident who will discuss with staff             Chief Complaint:   RLQ pain         History of Present Illness:   Ms Pearson is a 25 yo woman with no significant pmh presenting with one day of abdominal pain and nausea. Reports that the pain started diffuse and has now localized to the RLQ, describes it as sharp. Nausea at home today, emesis after receiving narcotics in the ED. Denies fever/chills, diarrhea/constipation, dysuria, CP/SOB. Has never had these symptoms before.               Past Medical History:     Past Medical History:   Diagnosis Date     Uncomplicated asthma              Past Surgical History:     Past Surgical History:   Procedure Laterality Date     NO HISTORY OF SURGERY     [place] teeth surgery, no history of complications from anesthesia or bleeding problems.           Social History:     Social History     Tobacco Use     Smoking status: Never Smoker     Smokeless tobacco: Never Used   Substance Use Topics     Alcohol use: No     Alcohol/week: 0.0 standard drinks             Family History:     Family History   Problem Relation Age of Onset     Cancer Maternal Grandmother         skin cancer     Breast Cancer Paternal Grandmother 76                Allergies:     Allergies   Allergen Reactions     Amoxicillin Rash             Medications:     Current Facility-Administered Medications    Medication     ondansetron (ZOFRAN) injection 4 mg     Current Outpatient Medications   Medication Sig     albuterol (PROAIR HFA/PROVENTIL HFA/VENTOLIN HFA) 108 (90 Base) MCG/ACT inhaler Inhale 2 puffs into the lungs every 6 hours as needed for shortness of breath / dyspnea or wheezing     FLUoxetine (PROZAC) 10 MG capsule Take 1 capsule (10 mg) by mouth daily Needs follow up visit for any further refills.     ibuprofen (ADVIL/MOTRIN) 600 MG tablet Take one po q day prn headache.  Needs labs for further refills     norethindrone (MICRONOR) 0.35 MG tablet Take 1 tablet (0.35 mg) by mouth daily               Review of Systems:   Negative except as listed in the HPI          Physical Exam:   All vitals have been reviewed  Temp:  [97.9  F (36.6  C)] 97.9  F (36.6  C)  Pulse:  [] 85  Resp:  [18] 18  BP: (117-132)/(68-86) 128/81  SpO2:  [91 %-100 %] 100 %  No intake or output data in the 24 hours ending 04/18/21 2243  Physical Exam:  Resting comfortably in bed, NAD  Breathing nonlabored on room air  RRR to palpation  Abd soft, nondistended, tender to deep palpation in RLQ, nontender to percussion, no guarding  Ext WWP          Data:   All laboratory data reviewed    Results:  BMP  Recent Labs   Lab 04/18/21  1810      POTASSIUM 3.6   CHLORIDE 102   CO2 25   BUN 16   CR 0.79   GLC 77     CBC  Recent Labs   Lab 04/18/21  1810   WBC 12.4*   HGB 15.1        LFT  Recent Labs   Lab 04/18/21  1810   AST 17   ALT 23   ALKPHOS 72   BILITOTAL 1.0   ALBUMIN 4.4     Recent Labs   Lab 04/18/21  1810   GLC 77       Imaging: CT AP:   1. The appendix is mildly dilated with wall thickening and enhancement  and mild periappendiceal inflammatory changes, indicating appendicitis  without evidence of perforation or abscess.  2. Small amount of free fluid in the pelvis, possibly physiologic.         Ivone Anderson MD

## 2021-04-19 NOTE — DISCHARGE SUMMARY
[facility]  Discharge Summary  Emergency General Surgery     Tanya Pearson MRN# [id number]   YOB: 1995 Age: 26 year old     Date of Admission:  4/18/2021  Date of Discharge::  4/19/2021  Admitting Physician:  Anton Nicole MD  Discharge Physician:    Primary Care Physician:        Dara Pickett          Admission Diagnoses:   Acute appendicitis, unspecified acute appendicitis type [K35.80]          Discharge Diagnosis:   Acute appendicitis, unspecified acute appendicitis type [K35.80]         Procedures:   Laparoscopic appendectomy              Consultations:   None         Imaging Studies:     Results for orders placed or performed during the hospital encounter of 04/18/21   CT Abdomen Pelvis w Contrast     Value    Radiologist flags Uncomplicated appendicitis (Urgent)    Narrative    EXAMINATION: CT ABDOMEN PELVIS W CONTRAST, 4/18/2021 8:38 PM    TECHNIQUE:  Helical CT images from the lung bases through the  symphysis pubis were obtained with contrast.  Coronal reformatted  images were generated at a workstation for further assessment.    CONTRAST:  86 cc Isovue 370 IV.    COMPARISON: None.    HISTORY: RLQ abdominal pain, appendicitis suspected (Age >= 14y)    FINDINGS:    Abdomen and pelvis:   Liver: No suspicious liver lesions. Portal veins appear patent.  Gallbladder: No gallstones. No evidence of acute cholecystitis.  Spleen: Normal size.  Pancreas: No suspicious pancreatic lesions. The pancreatic duct is not  dilated.  Adrenal glands: No adrenal nodules.  Kidneys: No kidney masses. No hydronephrosis or obstructing renal  stones.  Bladder / Pelvic organs: 2.5 x 2.1 cm left adnexal cyst.  Bowel: No bowel wall thickening. No dilated loops of small or large  bowel. The appendix is mildly dilated, measuring 8 mm in diameter.  There is wall thickening and hyperenhancement of the appendix. There  are mild periappendiceal inflammatory changes.  Lymph nodes: No  retroperitoneal, mesenteric, or pelvic  lymphadenopathy.  Fluid: Small amount of free fluid within the pelvis. No free air.   Vessels: No infrarenal aortic aneurysm.     Lung bases: No consolidation or pleural effusion.    Bones and soft tissues: No suspicious osseous lesions. Small  fat-containing periumbilical hernia.      Impression    IMPRESSION:   1. The appendix is mildly dilated with wall thickening and enhancement  and mild periappendiceal inflammatory changes, indicating appendicitis  without evidence of perforation or abscess.  2. Small amount of free fluid in the pelvis, possibly physiologic.    [Urgent Result: Uncomplicated appendicitis]    Finding was identified on 4/18/2021 9:35 PM.     The ED provider, Dr. eSgovia was contacted by Dr. Erendira Pagan at  4/18/2021 9:45 PM and verbalized understanding of the urgent finding.     I have personally reviewed the examination and initial interpretation  and I agree with the findings.    EDDIE NOBLE MD              Medications Prior to Admission:     Medications Prior to Admission   Medication Sig Dispense Refill Last Dose     albuterol (PROAIR HFA/PROVENTIL HFA/VENTOLIN HFA) 108 (90 Base) MCG/ACT inhaler Inhale 2 puffs into the lungs every 6 hours as needed for shortness of breath / dyspnea or wheezing 1 Inhaler 3 Past Month at Unknown time     FLUoxetine (PROZAC) 10 MG capsule Take 1 capsule (10 mg) by mouth daily Needs follow up visit for any further refills. 30 capsule 0 Past Week at Unknown time     norethindrone (MICRONOR) 0.35 MG tablet Take 1 tablet (0.35 mg) by mouth daily 84 tablet 3 Past Week at Unknown time     [DISCONTINUED] ibuprofen (ADVIL/MOTRIN) 600 MG tablet Take one po q day prn headache.  Needs labs for further refills 30 tablet 0 Past Month at Unknown time              Discharge Medications:     Current Discharge Medication List      START taking these medications    Details   acetaminophen (TYLENOL) 325 MG tablet Take 3 tablets (881  mg) by mouth every 6 hours for 3 days  Qty: 36 tablet, Refills: 0    Associated Diagnoses: Acute appendicitis, unspecified acute appendicitis type      oxyCODONE (ROXICODONE) 5 MG tablet Take 1-2 tablets (5-10 mg) by mouth every 4 hours as needed for breakthrough pain or severe pain  Qty: 10 tablet, Refills: 0    Associated Diagnoses: Acute appendicitis, unspecified acute appendicitis type      SENNA-docusate sodium (SENNA S) 8.6-50 MG tablet Take 1 tablet by mouth 2 times daily  Qty: 30 tablet, Refills: 0    Associated Diagnoses: Acute appendicitis, unspecified acute appendicitis type         CONTINUE these medications which have CHANGED    Details   ibuprofen (ADVIL/MOTRIN) 600 MG tablet Take 1 tablet (600 mg) by mouth every 6 hours as needed for moderate pain  Qty: 30 tablet, Refills: 0    Associated Diagnoses: Acute appendicitis, unspecified acute appendicitis type         CONTINUE these medications which have NOT CHANGED    Details   albuterol (PROAIR HFA/PROVENTIL HFA/VENTOLIN HFA) 108 (90 Base) MCG/ACT inhaler Inhale 2 puffs into the lungs every 6 hours as needed for shortness of breath / dyspnea or wheezing  Qty: 1 Inhaler, Refills: 3    Associated Diagnoses: Mild intermittent asthma without complication      FLUoxetine (PROZAC) 10 MG capsule Take 1 capsule (10 mg) by mouth daily Needs follow up visit for any further refills.  Qty: 30 capsule, Refills: 0    Comments: PHQ9/GAD7 sent via [email or web address], along with appt need  Associated Diagnoses: Adjustment disorder with mixed anxiety and depressed mood; Anxiety      norethindrone (MICRONOR) 0.35 MG tablet Take 1 tablet (0.35 mg) by mouth daily  Qty: 84 tablet, Refills: 3    Associated Diagnoses: Dysmenorrhea                      Brief History of Illness:   Ms Pearson is a 27 yo woman with no significant pmh presenting with uncomplicated appendicitis.             Hospital Course:   Ms. Pearson was admitted 04/18 for appendicitis. She presented with abdominal  "pain but was stable w/o fevers, tachycardia, hypotension, and was admitted to the floor for plans of appendectomy 04/19 AM. She had mild leukocytosis at 12.4. She received x1 dose of ertapenem 04/19AM. She underwent a laparoscopic appendectomy 04/19 AM that was uncomplicated. She was transferred to PACU, to observation, and discharged home the same day given ambulation w/o assistance, pain control with oral pain medications, toleration of regular diet.    The General Surgery clinic will contact the patient in the next 1-2 days, with follow up prn.          Day of Discharge Physical Exam:   Blood pressure 112/74, pulse 85, temperature 98.1  F (36.7  C), temperature source Oral, resp. rate 18, height 1.676 m (5' 6\"), weight 62.2 kg (137 lb 2 oz), SpO2 99 %, not currently breastfeeding.    Gen: AAOx3, NAD  Pulm: Non-labored breathing  Abd: Soft, appropriately tender, no guarding   Incision C/D/I with exofin   Ext:  Warm and well-perfused         Final Pathology Result:   Pending at time of discharge           Discharge Instructions and Follow-Up:     Discharge Procedure Orders   Weight bearing status - As tolerated     No driving or operating machinery    Order Comments: until the day after procedure     No Alcohol   Order Comments: For 24 hours post procedure     Diet Instructions   Order Comments: Resume pre-procedure diet     Shower   Order Comments: No shower for 24 hours post procedure. May shower Postoperative Day (POD)  1     Ice to affected area   Order Comments: Ice to operative site PRN     Dressing   Order Comments: Keep dressing clean and dry.  Dressing / incisional care as instructed by RN and or Surgeon            Home Health Care:   Not needed           Discharge Disposition:   Discharged to home      Condition at discharge: Stable    Angelito Singleton MD  PGY-1     Soo Ferro MD  PGY-1 Surgery   "

## 2021-04-19 NOTE — PLAN OF CARE
Patient received tylenol for abdominal pain and reported relief, ambulated and ate with no problems.

## 2021-04-19 NOTE — OP NOTE
[facility]    Operative Note    Pre-operative diagnosis: Appendicitis [K37]  Post-operative diagnosis Same as pre-operative diagnosis    Procedure: Procedure(s):  APPENDECTOMY, LAPAROSCOPIC  Surgeon: Surgeon(s) and Role:     * Doc Vazquez DO - Primary     * Michael Fabian MD - Resident - Assisting  Anesthesia: General   Estimated blood loss: Less than 10 ml  Drains: None  Specimens:   ID Type Source Tests Collected by Time Destination   A : Appendix Organ Appendix SURGICAL PATHOLOGY EXAM Doc Vazquez DO 4/19/2021 12:55 PM      Findings:   acute appendicitis; otherwise normal anatomy.  Complications: None.    ANESTHESIA:   General endotracheal.     INDICATIONS FOR PROCEDURE:   Tanya Pearson is a 26 year old female who presented with abdominal pain.  Workup of this pain revealed acute appendicitis.  Based on this, laparoscopic appendectomy was recommended.    FINDINGS:   Acute  Appendicitis w/out perforation    PROCEDURE:   The patient was brought to the operating room and placed in the supine position upon the operating table. Nursing and anesthesia assured proper positioning prior to the procedure. A time-out was taken to assure proper patient, site and procedure with all in the operating room.         A 5 mm infraumbilical incision was made and a 5 mm optiview trochar was placed under direct visualization.  The abdomen was insufflated to 15 mm Hg.Laparoscope was placed and a 4-quadrant scan of the abdomen revealed normal anatomy. Next, two additional 5-millimeter ports were placed, one in the suprapubic position and one in the left lower quadrant position. Both were placed after adequate local anesthesia and under direct laparoscopic visualization. After placement of all ports the appendix was localized and grasped. The appendix was dilated and inflamed.  Next, the appendiceal base was clearly identified. We created a window in  the appendiceal mesentery at the base of the appendix.  The mesoappendix and appendiceal artery were ligated with Ligasure device. Next, two endoloops were placed around the appendix and suture ligated.  The appendix was transected with Ligasure device.  At this point, the appendix was completely free. It was removed from the abdomen by way of the EndoCatch bag.  The appendiceal stump was evaluated and hemostasis was confirmed.   Next,  laparoscopic ports were removed, under direct visualization and Pneumoperitoneum was released. Skin incisions were closed with 4-0 vicryl in a subcuticular fashion, and dermabond was applied.  The patient tolerated the procedure well and was transferred to the postanesthesia recovery room in stable condition.     Doc Vazquez, [facility]

## 2021-04-19 NOTE — PLAN OF CARE
"Patient returned from PACU, alert and oriented, has 3 lap sites clean dry and intact. Patient reported abdominal pain in the right upper lower quadrant. /74 (BP Location: Right arm)   Pulse 85   Temp 98.1  F (36.7  C) (Oral)   Resp 18   Ht 1.676 m (5' 6\")   Wt 62.2 kg (137 lb 2 oz)   LMP  (LMP Unknown)   SpO2 99%   BMI 22.13 kg/m      "

## 2021-04-19 NOTE — BRIEF OP NOTE
[facility]    Brief Operative Note    Pre-operative diagnosis: Appendicitis [K37]  Post-operative diagnosis Same as pre-operative diagnosis    Procedure: Procedure(s):  APPENDECTOMY, LAPAROSCOPIC  Surgeon: Surgeon(s) and Role:     * Doc Vazquez DO - Primary     * Michael Fabian MD - Resident - Assisting  Anesthesia: General   Estimated blood loss: Less than 10 ml  Drains: None  Specimens:   ID Type Source Tests Collected by Time Destination   A : Appendix Organ Appendix SURGICAL PATHOLOGY EXAM Doc Vazquez DO 4/19/2021 12:55 PM      Findings:   acute appendicitis; otherwise normal anatomy.  Complications: None.  Implants: * No implants in log *

## 2021-04-19 NOTE — PLAN OF CARE
Discharge instructions given and explained to patient. PIV removed. The patient discharged with prescriptions and belongings, accompanied by her mother.

## 2021-04-19 NOTE — PROGRESS NOTES
Paged surgery team:  When should patient receive ertapenem? Is now okay or should she get that in PreOp? Also, is it okay to release all admission orders?

## 2021-04-19 NOTE — PLAN OF CARE
"Reason for admission: abdominal pain    Vitals:   /71   Pulse 81   Temp 98.2  F (36.8  C) (Oral)   Resp 16   Ht 1.676 m (5' 6\")   Wt 62.2 kg (137 lb 2 oz)   LMP  (LMP Unknown)   SpO2 95%   BMI 22.13 kg/m      Activity: ambulates independently     Neuro: Alert and oriented. Non focal    Cardiac: Denies any chest discomfort or palpitations. VSS    Respiratory: Denies any shortness of breath, regular rate and a effort.    GI/: Ongoing abdominal discomfort without nausea. She rates pain 7/10 but declines IV pain medicine due to nausea it made her feel. She tells me her pain is tolerable. She curran snot appear in acute distress, resting between cares. Denies any dysuria.    Diet: NPO strict    Lines: Left antecubital PIV saline locked and patent    Wounds/Incisions: none      *Patient triggered sepsis protocol. WBC elevated. Slightly tachycardic. Treatment team aware.        "

## 2021-04-19 NOTE — ANESTHESIA PREPROCEDURE EVALUATION
Anesthesia Pre-Procedure Evaluation    Patient: Tanya Peasron   MRN: [id number] : 1995        Preoperative Diagnosis: Appendicitis [K37]   Procedure : Procedure(s):  APPENDECTOMY, LAPAROSCOPIC     Past Medical History:   Diagnosis Date     Uncomplicated asthma       Past Surgical History:   Procedure Laterality Date     NO HISTORY OF SURGERY        Allergies   Allergen Reactions     Amoxicillin Rash      Social History     Tobacco Use     Smoking status: Never Smoker     Smokeless tobacco: Never Used   Substance Use Topics     Alcohol use: No     Alcohol/week: 0.0 standard drinks      Wt Readings from Last 1 Encounters:   21 63.5 kg (140 lb)        Anesthesia Evaluation            ROS/MED HX  ENT/Pulmonary:     (+) Intermittent, asthma     Neurologic:  - neg neurologic ROS     Cardiovascular:  - neg cardiovascular ROS     METS/Exercise Tolerance:     Hematologic:  - neg hematologic  ROS     Musculoskeletal:  - neg musculoskeletal ROS     GI/Hepatic: Comment: Appendicitis        Renal/Genitourinary:  - neg Renal ROS     Endo:  - neg endo ROS     Psychiatric/Substance Use:  - neg psychiatric ROS     Infectious Disease: Comment: covid negative   - neg infectious disease ROS     Malignancy:  - neg malignancy ROS     Other:     (-) Any chance pregnant       Physical Exam    Airway        Mallampati: I   TM distance: > 3 FB   Neck ROM: full   Mouth opening: > 3 cm    Respiratory Devices and Support         Dental           Cardiovascular          Rhythm and rate: regular and normal     Pulmonary           breath sounds clear to auscultation           OUTSIDE LABS:  CBC:   Lab Results   Component Value Date    WBC 12.4 (H) 2021    WBC 9.0 2016    HGB 15.1 2021    HGB 14.5 2016    HCT 44.3 2021    HCT 44.1 2016     2021     2016     BMP:   Lab Results   Component Value Date     2021    POTASSIUM 3.6 2021    CHLORIDE 102  04/18/2021    CO2 25 04/18/2021    BUN 16 04/18/2021    CR 0.79 04/18/2021    GLC 77 04/18/2021     COAGS: No results found for: PTT, INR, FIBR  POC:   Lab Results   Component Value Date    HCGS Negative 04/18/2021     HEPATIC:   Lab Results   Component Value Date    ALBUMIN 4.4 04/18/2021    PROTTOTAL 7.8 04/18/2021    ALT 23 04/18/2021    AST 17 04/18/2021    ALKPHOS 72 04/18/2021    BILITOTAL 1.0 04/18/2021     OTHER:   Lab Results   Component Value Date    GUZMAN 9.3 04/18/2021    TSH 0.71 02/22/2019       Anesthesia Plan    ASA Status:  1      Anesthesia Type: General.     - Airway: ETT      Maintenance: Balanced.   Techniques and Equipment:     - Lines/Monitors: 2nd IV     Consents    Anesthesia Plan(s) and associated risks, benefits, and realistic alternatives discussed. Questions answered and patient/representative(s) expressed understanding.     - Discussed with:  Patient      - Extended Intubation/Ventilatory Support Discussed: No.      - Patient is DNR/DNI Status: No    Use of blood products discussed: No .     Postoperative Care    Pain management: IV analgesics.   PONV prophylaxis: Ondansetron (or other 5HT-3), Dexamethasone or Solumedrol     Comments:                Srinivasa Kolb DO

## 2021-04-19 NOTE — ANESTHESIA CARE TRANSFER NOTE
Patient: Tanya Pearson    Procedure(s):  APPENDECTOMY, LAPAROSCOPIC    Diagnosis: Appendicitis [K37]  Diagnosis Additional Information: No value filed.    Anesthesia Type:   General     Note:    Oropharynx: oropharynx clear of all foreign objects  Level of Consciousness: awake  Oxygen Supplementation: face mask  Level of Supplemental Oxygen (L/min / FiO2): 8  Independent Airway: airway patency satisfactory and stable  Dentition: dentition unchanged  Vital Signs Stable: post-procedure vital signs reviewed and stable  Report to RN Given: handoff report given  Patient transferred to: PACU    Handoff Report: Identifed the Patient, Identified the Reponsible Provider, Reviewed the pertinent medical history, Discussed the surgical course, Reviewed Intra-OP anesthesia mangement and issues during anesthesia, Set expectations for post-procedure period and Allowed opportunity for questions and acknowledgement of understanding      Vitals: (Last set prior to Anesthesia Care Transfer)  CRNA VITALS  4/19/2021 1246 - 4/19/2021 1322      4/19/2021             NIBP:  110/86    Pulse:  103    NIBP Mean:  88    SpO2:  100 %    Resp Rate (observed):  20        Electronically Signed By: CODY Gaspar CRNA  April 19, 2021  1:22 PM

## 2021-04-19 NOTE — OR NURSING
Report given to Elisabeth and call placed to patient Mother to update her on status of the patient.

## 2021-04-21 ENCOUNTER — PATIENT OUTREACH (OUTPATIENT)
Dept: SURGERY | Facility: CLINIC | Age: 26
End: 2021-04-21

## 2021-04-21 NOTE — LETTER
April 28, 2021      TO: Tanya GHOTRA Pearson  [address] [place] [address]         Dear Ms. Tanya Pearson,    We hope that you are recovering well after undergoing laparoscopic appendectomy on April 19, 2021, with Dr. Vazquez.  Our office has been trying to reach you via telephone regarding your pathology results and ongoing care/recommendations but has been unsuccessful.  Your cell phone mailbox is full and we have been unable to leave a message.  We have left several messages on the voicemail of your listed home phone with no return call; we have been unable to reach you.    Your continuing good health is important to us.  Please contact our office at [phone] as soon as possible. If you have received care elsewhere, please alert us so that we can update our records.  This letter will serve as a final attempt to reach you.    Sincerely,         Delisa Camacho RN, BSN, CNOR, RNFA, CBCN on behalf of Doc Vazquez          (Mailed via USPS Certified Mail with signed returned receipt)
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
## 2021-04-26 LAB — COPATH REPORT: NORMAL

## 2021-04-29 ENCOUNTER — TELEPHONE (OUTPATIENT)
Dept: SURGERY | Facility: CLINIC | Age: 26
End: 2021-04-29

## 2021-04-29 DIAGNOSIS — C18.1 MALIGNANT NEOPLASM OF APPENDIX (H): Primary | ICD-10-CM

## 2021-04-29 NOTE — CONFIDENTIAL NOTE
Spoke with patient and she is aware that Dr. Vazquez needs to discuss pathology with her and recommendations.  Patient confirmed that her phone number is [phone].  Patient informed that her mailbox is full and our team was unable to leave her a message.      Message sent to provider to call patient. Video appointment arranged 5/3 at 1300.    Await further direction from provider.

## 2021-04-29 NOTE — TELEPHONE ENCOUNTER
[facility] Call Center    Phone Message    May a detailed message be left on voicemail: yes     Reason for Call: Other: Tanya returning Delisa's call. Please call her back as soon as possible to discuss.      Action Taken: Message routed to:  [facility]    Travel Screening: Not Applicable

## 2021-05-01 ENCOUNTER — ANCILLARY PROCEDURE (OUTPATIENT)
Dept: CT IMAGING | Facility: CLINIC | Age: 26
End: 2021-05-01
Attending: SURGERY
Payer: COMMERCIAL

## 2021-05-01 DIAGNOSIS — C18.1 MALIGNANT NEOPLASM OF APPENDIX (H): ICD-10-CM

## 2021-05-01 PROCEDURE — 71260 CT THORAX DX C+: CPT | Performed by: RADIOLOGY

## 2021-05-01 PROCEDURE — 74177 CT ABD & PELVIS W/CONTRAST: CPT | Performed by: RADIOLOGY

## 2021-05-01 RX ORDER — IOPAMIDOL 755 MG/ML
84 INJECTION, SOLUTION INTRAVASCULAR ONCE
Status: COMPLETED | OUTPATIENT
Start: 2021-05-01 | End: 2021-05-01

## 2021-05-01 RX ADMIN — IOPAMIDOL 84 ML: 755 INJECTION, SOLUTION INTRAVASCULAR at 12:51

## 2021-05-03 ENCOUNTER — VIRTUAL VISIT (OUTPATIENT)
Dept: SURGERY | Facility: CLINIC | Age: 26
End: 2021-05-03
Payer: COMMERCIAL

## 2021-05-03 ENCOUNTER — TELEPHONE (OUTPATIENT)
Dept: ONCOLOGY | Facility: CLINIC | Age: 26
End: 2021-05-03

## 2021-05-03 DIAGNOSIS — D3A.020 CARCINOID TUMOR OF APPENDIX, UNSPECIFIED WHETHER MALIGNANT (H): ICD-10-CM

## 2021-05-03 PROBLEM — D3A.00 CARCINOID TUMOR (H): Status: ACTIVE | Noted: 2021-05-03

## 2021-05-03 PROBLEM — K35.80 ACUTE APPENDICITIS, UNSPECIFIED ACUTE APPENDICITIS TYPE: Status: RESOLVED | Noted: 2021-04-18 | Resolved: 2021-05-03

## 2021-05-03 PROCEDURE — 99024 POSTOP FOLLOW-UP VISIT: CPT | Mod: 24 | Performed by: SURGERY

## 2021-05-03 NOTE — ASSESSMENT & PLAN NOTE
Incidental finding on appendix after laparoscopic appendectomy for acute appendicitis.  Recovering well from initial surgery with no concerns from port site. Post op pain resolved.    No restrictions from initial surgery.    Follow-up conversation regarding pathology results.  CT Chest/A/P and dual phase liver CT scan all completed.    Appointment with Oncology for this Friday.  Requesting discussion around need for Right Delgado-Colectomy vs. Monitoring.     All questions answered at this time.  Plan to meet with Pt again after her appointment with oncology to discuss next steps in care.     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) with   discontinuous involvement of the tip and   focal extension to the serosal surface   - Acute appendicitis with serositis   - Proximal surgical margin is free of neoplasia    TUMOR     Tumor Site:         - Diffusely involving appendix - tumor is located proximal with         discontinuous involvement of the distal tip     Histologic Type and Grade:         - G1: Well-differentiated neuroendocrine tumor     Mitotic Rate:         - < 2 mitoses / 2 mm2     Ki-67 Labeling Index:         - < 3%     Tumor Size: 0.5 x 0.5 x 0.3 Centimeters (cm)     Tumor Extension:         - Tumor perforates the visceral peritoneum (serosa)     Lymphovascular Invasion:         - Not identified     Perineural Invasion:         - Not identified     MARGINS     Proximal Margin:         - Uninvolved by tumor     Radial or Mesenteric:         - Involved by tumor     LYMPH NODES     Regional Lymph Nodes:         - No lymph nodes submitted or found     PATHOLOGIC STAGE CLASSIFICATION (PTNM, AJCC 8TH EDITION)     Primary Tumor (pT):         - pT4     Regional Lymph Nodes (pN):         - pNX     ADDITIONAL FINDINGS     Additional Findings:         - Acute appendicitis

## 2021-05-03 NOTE — LETTER
5/3/2021       RE: Tanya Pearson  [address] [facility] [address]     Dear Colleague,    Thank you for referring your patient, Tanya Pearson, to the [facility] GENERAL SURGERY [facility]INIC [place] [facility]. Please see a copy of my visit note below.    Tanya is a 26 year old who is being evaluated via a billable video visit.      How would you like to obtain your AVS? MyChart  If the video visit is dropped, the invitation should be resent by: Text to cell phone  Will anyone else be joining your video visit? No      Video Start Time: 12:57 PM    Assessment & Plan   Problem List Items Addressed This Visit        Other    Carcinoid tumor     Incidental finding on appendix after laparoscopic appendectomy for acute appendicitis.  Recovering well from initial surgery with no concerns from port site. Post op pain resolved.    No restrictions from initial surgery.    Follow-up conversation regarding pathology results.  CT Chest/A/P and dual phase liver CT scan all completed.    Appointment with Oncology for this Friday.  Requesting discussion around need for Right Delgado-Colectomy vs. Monitoring.     All questions answered at this time.  Plan to meet with Pt again after her appointment with oncology to discuss next steps in care.     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) with   discontinuous involvement of the tip and   focal extension to the serosal surface   - Acute appendicitis with serositis   - Proximal surgical margin is free of neoplasia    TUMOR     Tumor Site:         - Diffusely involving appendix - tumor is located proximal with         discontinuous involvement of the distal tip     Histologic Type and Grade:         - G1: Well-differentiated neuroendocrine tumor     Mitotic Rate:         - < 2 mitoses / 2 mm2     Ki-67 Labeling Index:         - < 3%     Tumor Size: 0.5 x 0.5 x 0.3 Centimeters (cm)      Tumor Extension:         - Tumor perforates the visceral peritoneum (serosa)     Lymphovascular Invasion:         - Not identified     Perineural Invasion:         - Not identified     MARGINS     Proximal Margin:         - Uninvolved by tumor     Radial or Mesenteric:         - Involved by tumor     LYMPH NODES     Regional Lymph Nodes:         - No lymph nodes submitted or found     PATHOLOGIC STAGE CLASSIFICATION (PTNM, AJCC 8TH EDITION)     Primary Tumor (pT):         - pT4     Regional Lymph Nodes (pN):         - pNX     ADDITIONAL FINDINGS     Additional Findings:         - Acute appendicitis                 Review of the result(s) of each unique test - Pathology report, CT Chest/Abdomen/Pelvis, CT Liver.  30 minutes spent on the date of the encounter doing chart review, history and exam, documentation and further activities per the note       CONSULTATION/REFERRAL to Oncology    Plan for follow-up after discussion with oncology    [facility] is a 26 year old who presents for the following health issues  accompanied by her mother:    HPI     26-year-old  female who presented to the [facility] ED on 4/18/2021 for worsening right lower quadrant abdominal pain.  Patient was diagnosed with acute appendicitis by CT.  On 4/19, patient underwent laparoscopic appendectomy without complication.  She was discharged home that evening.  Postoperatively she has been feeling well, and postoperative pain has all resolved.  She has not had any continuing issues or concerns regarding recovery from surgery.    Patient's path report returned positive for carcinoid tumor that is a T4 lesion.  A phone call was placed and the patient was contacted and updated of results via [employer] as well as phone call.  At that time CT scans as well as oncology referral were placed.    Appointment for today was scheduled to follow-up with  the patient to discuss CT scan findings as well as to ensure appointment for oncology was made.  Point was also used to discuss any remaining questions or new questions that the patient may have from her previous phone conversation.    Review of Systems   Constitutional, HEENT, cardiovascular, pulmonary, GI, , musculoskeletal, neuro, skin, endocrine and psych systems are negative, except as otherwise noted.      Objective           Vitals:  No vitals were obtained today due to virtual visit.    Physical Exam   GENERAL: Healthy, alert and no distress  EYES: Eyes grossly normal to inspection.  No discharge or erythema, or obvious scleral/conjunctival abnormalities.  HENT: Normal cephalic/atraumatic.  External ears, nose and mouth without ulcers or lesions.  No nasal drainage visible.  RESP: No audible wheeze, cough, or visible cyanosis.  No visible retractions or increased work of breathing.    SKIN: Visible skin clear. No significant rash, abnormal pigmentation or lesions.  NEURO: Cranial nerves grossly intact.  Mentation and speech appropriate for age.  PSYCH: Mentation appears normal, affect normal/bright, judgement and insight intact, normal speech and appearance well-groomed.    Ct Chest/abdomen/pelvis W Contrast: images/reports reviewed with patient.     Result Date: 5/1/2021  EXAMINATION: CT CHEST/ABDOMEN/PELVIS W CONTRAST, 5/1/2021 1:14 PM TECHNIQUE:  Helical CT images from the thoracic inlet through the symphysis pubis were obtained  with contrast. Contrast dose: Isovue 370  84 mls COMPARISON: 4/18/2021 HISTORY: Colon cancer, initial workup; s/p appendectomy with carcinoid on path (pT4 with positive radial margin); Malignant neoplasm of appendix (H) FINDINGS: Lungs: Mild bilateral lower lobes dependent atelectasis. No acute airspace opacities. No focal consolidation. No suspicious pulmonary nodules. Mediastinum: Partially visualized thyroid gland with no nodules. Central tracheobronchial tree is patent.  Mild patulous esophagus. Minimal soft tissue density in the anterior mediastinum likely representing residual thymic tissue. Minimal fluid in the superior aortic recess. Thoracic aorta and main pulmonary artery with normal diameter. No central pulmonary embolism. Cardiac size within normal limits. No pericardial effusions. No pleural effusions. No pneumothorax. Scattered nonenlarged mediastinal and hilar lymph nodes. Prominent axillary lymph nodes, left greater than right, indeterminate, likely reactive. Abdomen and pelvis: No arterially enhancing liver lesions. No suspicious liver lesions. Focal fat deposition and/or perfusional changes along the falciform ligament. Patent liver vasculature. No biliary tree dilation. Unremarkable gallbladder. Homogeneous pancreas. Main pancreatic duct is not dilated. The spleen is not enlarged. No focal splenic lesions. No adrenal gland nodules. No renal stones or hydronephrosis. No suspicious renal lesions. Small fat-containing umbilical hernia. Unremarkable urinary bladder. Unremarkable uterus. No adnexal masses. No free fluid. No free air. No inguinal lymphadenopathy. Subcentimeter pelvic lymph nodes with no convincing worrisome features. No abdominal aortic aneurysm. No dilated loops of bowel. No bowel wall thickening. Moderate colonic stool burden. Prior appendectomy. No evidence for local recurrence. Subcentimeter retroperitoneal and mesenteric lymph nodes including in the right lower quadrant, series 10 images 36-41, not enlarged by size criteria. Bones: No convincing aggressive bone lesions. Mild lumbar curvature with convexity to the left.     IMPRESSION: In this patient with history of appendiceal carcinoid status post appendectomy: 1. No convincing evidence for local recurrence and/or metastatic disease in the chest, abdomen, pelvis and bones. 2. Additional incidental findings as described above. MELISSA JIMENEZ MD          Video-Visit Details    Type of service:   "Video Visit    Video End Time:1:11 PM    Originating Location (pt. Location): Home    Distant Location (provider location):  [facility]     Platform used for Video Visit: Lolita    Tanya Pearson is a 26 year old female who is being evaluated via a billable video visit.      The patient has been notified of following:     \"This video visit will be conducted via a call between you and your physician/provider. We have found that certain health care needs can be provided without the need for an in-person physical exam.  This service lets us provide the care you need with a video conversation.  If a prescription is necessary we can send it directly to your pharmacy.  If lab work is needed we can place an order for that and you can then stop by our lab to have the test done at a later time.    If during the course of the call the physician/provider feels a video visit is not appropriate, you will not be charged for this service.\"     Patient confirmed that they are in [place] today's visit [place].    Video-Visit Details  Type of service:  Video Visit    Video Start Time: 1257  Video End Time:  1307    Originating Location (pt. Location): Home    Distant Location (provider location):  [facility]     Platform used: Lolita    Again, thank you for allowing me to participate in the care of your patient.      Sincerely,    Doc Vazquez, DO      "

## 2021-05-03 NOTE — TELEPHONE ENCOUNTER
RECORDS STATUS - ALL OTHER DIAGNOSIS      RECORDS RECEIVED FROM: [facility]   DATE RECEIVED: 5/3   NOTES STATUS DETAILS   OFFICE NOTE from referring provider [facility] Doc Vazquez DO in [facility] GENERAL SURGERY   OFFICE NOTE from medical oncologist     DISCHARGE SUMMARY from hospital [facility] 4/18/21   DISCHARGE REPORT from the ER     OPERATIVE REPORT [facility] 4/19/21: Appendecotmy   MEDICATION LIST [facility] 4/19/21   CLINICAL TRIAL TREATMENTS TO DATE     LABS     PATHOLOGY REPORTS [facility] 4/19/21: Surg Path   ANYTHING RELATED TO DIAGNOSIS Epic 4/18/21   GENONOMIC TESTING     TYPE:     IMAGING (NEED IMAGES & REPORT)     CT SCANS PACS 5/1/21, 4/18/21: [facility]   MRI     MAMMO     ULTRASOUND     PET

## 2021-05-03 NOTE — PATIENT INSTRUCTIONS
You met with Dr. Doc Vazquez.      Today's visit instructions:    Our office will contact you after your Oncology appointment with a plan.     If you have questions please contact Delisa CANO during regular clinic hours, Monday through Friday 7:30 AM - 4:00 PM, or you can contact us via [employer] at anytime.       If you have urgent needs after-hours, weekends, or holidays please call the hospital at [phone] and ask to speak with our on-call General Surgery Team.    Appointment [phone], option #1   Nurse Advice (Delisa): [phone]   Surgery Scheduler (Ping): [phone]  Fax: [phone]

## 2021-05-03 NOTE — TELEPHONE ENCOUNTER
Left [facility] for Pt to call back to reschedule canceled 5/7 visit with Dr. Merida per IB request (see below)      NPS, Dr Reis has opening tomorrow Tues 5/4 at 12pm for video visit. Please offer to this pt. Miki Merida. Thanks. Pa

## 2021-05-03 NOTE — PROGRESS NOTES
"Tanya Pearson is a 26 year old female who is being evaluated via a billable video visit.      The patient has been notified of following:     \"This video visit will be conducted via a call between you and your physician/provider. We have found that certain health care needs can be provided without the need for an in-person physical exam.  This service lets us provide the care you need with a video conversation.  If a prescription is necessary we can send it directly to your pharmacy.  If lab work is needed we can place an order for that and you can then stop by our lab to have the test done at a later time.    If during the course of the call the physician/provider feels a video visit is not appropriate, you will not be charged for this service.\"     Patient confirmed that they are in [place] for today's visit [place].    Video-Visit Details  Type of service:  Video Visit    Video Start Time: 1257  Video End Time:  1307    Originating Location (pt. Location): Home    Distant Location (provider location):  [facility] SURGERY [facility]     Platform used: Lolita            "

## 2021-05-04 ENCOUNTER — VIRTUAL VISIT (OUTPATIENT)
Dept: ONCOLOGY | Facility: CLINIC | Age: 26
End: 2021-05-04
Attending: SURGERY
Payer: COMMERCIAL

## 2021-05-04 DIAGNOSIS — C18.1 MALIGNANT NEOPLASM OF APPENDIX (H): ICD-10-CM

## 2021-05-04 DIAGNOSIS — D3A.020 CARCINOID TUMOR OF APPENDIX, UNSPECIFIED WHETHER MALIGNANT (H): Primary | ICD-10-CM

## 2021-05-04 PROCEDURE — 99203 OFFICE O/P NEW LOW 30 MIN: CPT | Mod: 95 | Performed by: INTERNAL MEDICINE

## 2021-05-04 PROCEDURE — 999N001193 HC VIDEO/TELEPHONE VISIT; NO CHARGE

## 2021-05-04 NOTE — PROGRESS NOTES
"Tanya is a 26 year old who is being evaluated via a billable video visit.      How would you like to obtain your AVS? MyChart  If the video visit is dropped, the invitation should be resent by: Text to cell phone: [phone]  Will anyone else be joining your video visit? No      I have reviewed and updated the patient's allergies and medication list.    Concerns: Tx.   Refills: None needed.       Vitals - Patient Reported  Weight (Patient Reported): 61.2 kg (135 lb)  Height (Patient Reported): 167.6 cm (5' 6\")  BMI (Based on Pt Reported Ht/Wt): 21.79  Pain Score: No Pain (0)    Felipa Teran CMA        Video-Visit Details    Type of service:  Video Visit    Originating Location (pt. Location): Home    Distant Location (provider location):  [facility]     Platform used for Video Visit: Lolita Pearson is a new patient I been asked to see with regards to recently resected appendiceal carcinoid.    She is a healthy 26-year-old woman who recently presented with abdominal pain and had CT scanning showing acute appendicitis.  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  She has recovered rapidly from her surgery and had a postoperative scan that is been unremarkable.  I could elicit no carcinoid type symptoms and she is otherwise asymptomatic.      She really has no significant prior medical history.    Her family history is relatively unremarkable with a maternal grandmother diagnosed with breast cancer in her 80s and a paternal grandfather who was a heavy smoker with lung cancer in his 50s.  She and her parents are unaware of any endocrine tumors, endocrinopathies or problems with kidney stones in the extended family.    She is currently unemployed.  She was vaping on a daily basis for most of the past year but is quit as of 2 weeks ago, and has not used tobacco.  Her parents participated in today's " visit as well.    GENERAL: Healthy, alert and no distress  EYES: Eyes grossly normal to inspection.  No discharge or erythema, or obvious scleral/conjunctival abnormalities.  RESP: No audible wheeze, cough, or visible cyanosis.  No visible retractions or increased work of breathing.    SKIN: Visible skin clear. No significant rash, abnormal pigmentation or lesions.  NEURO: Cranial nerves grossly intact.  Mentation and speech appropriate for age.  PSYCH: Mentation appears normal, affect normal/bright, judgement and insight intact, normal speech and appearance well-groomed.    I personally reviewed her pre and postoperative CT scan and showed the images to the patient and her family.  The only finding of any note is the changes of the appendicitis on the preoperative scan.  I see no evidence of peritoneal disease, significant adenopathy or evidence of hematogenous metastases.  Her lab work at presentation including electrolytes, renal function, liver enzymes and blood counts was notable only for mildly elevated white blood count.    Her pathologic specimen shows a 5 mm well differentiated neuroendocrine carcinoma with some focal areas of serosal involvement.  The proximal resection.    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  Other than having some of the tumor extend to the serosal surface and therefore technically having a positive radial margin she has almost no risk of recurrence from this.  I do not think there is a need for further surgical intervention.  I do not think there is a need for regular oncologic surveillance.    I reviewed the nature of her tumor and its very low risk of ever coming back.  We talked about what symptoms might suggest recurrence, such as abdominal pain, abdominal enlargement, or carcinoid symptoms.  I reassured her that the chance of any of these develop is extremely low.  I encouraged her not to become hypervigilant  about abdominal symptoms but that she should bring to attention progressive symptoms that persist over weeks to months and do not have another good explanation.

## 2021-05-04 NOTE — LETTER
"    5/4/2021         RE: Tanya Pearson  [address] [place] [address]        Dear Colleague,    Thank you for referring your patient, Tanya Pearson, to the [facility]. Please see a copy of my visit note below.    Tanya is a 26 year old who is being evaluated via a billable video visit.      How would you like to obtain your AVS? MyChart  If the video visit is dropped, the invitation should be resent by: Text to cell phone: [phone]  Will anyone else be joining your video visit? No      I have reviewed and updated the patient's allergies and medication list.    Concerns: Tx.   Refills: None needed.       Vitals - Patient Reported  Weight (Patient Reported): 61.2 kg (135 lb)  Height (Patient Reported): 167.6 cm (5' 6\")  BMI (Based on Pt Reported Ht/Wt): 21.79  Pain Score: No Pain (0)    Felipa Teran CMA        Video-Visit Details    Type of service:  Video Visit    Originating Location (pt. Location): Home    Distant Location (provider location):  [facility]     Platform used for Video Visit: Lolita Pearson is a new patient I been asked to see with regards to recently resected appendiceal carcinoid.    She is a healthy 26-year-old woman who recently presented with abdominal pain and had CT scanning showing acute appendicitis.  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  She has recovered rapidly from her surgery and had a postoperative scan that is been unremarkable.  I could elicit no carcinoid type symptoms and she is otherwise asymptomatic.      She really has no significant prior medical history.    Her family history is relatively unremarkable with a maternal grandmother diagnosed with breast cancer in her 80s and a paternal grandfather who was a heavy smoker with lung cancer in his 50s.  She and her parents are unaware of any endocrine " tumors, endocrinopathies or problems with kidney stones in the extended family.    She is currently unemployed.  She was vaping on a daily basis for most of the past year but is quit as of 2 weeks ago, and has not used tobacco.  Her parents participated in today's visit as well.    GENERAL: Healthy, alert and no distress  EYES: Eyes grossly normal to inspection.  No discharge or erythema, or obvious scleral/conjunctival abnormalities.  RESP: No audible wheeze, cough, or visible cyanosis.  No visible retractions or increased work of breathing.    SKIN: Visible skin clear. No significant rash, abnormal pigmentation or lesions.  NEURO: Cranial nerves grossly intact.  Mentation and speech appropriate for age.  PSYCH: Mentation appears normal, affect normal/bright, judgement and insight intact, normal speech and appearance well-groomed.    I personally reviewed her pre and postoperative CT scan and showed the images to the patient and her family.  The only finding of any note is the changes of the appendicitis on the preoperative scan.  I see no evidence of peritoneal disease, significant adenopathy or evidence of hematogenous metastases.  Her lab work at presentation including electrolytes, renal function, liver enzymes and blood counts was notable only for mildly elevated white blood count.    Her pathologic specimen shows a 5 mm well differentiated neuroendocrine carcinoma with some focal areas of serosal involvement.  The proximal resection.    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  Other than having some of the tumor extend to the serosal surface and therefore technically having a positive radial margin she has almost no risk of recurrence from this.  I do not think there is a need for further surgical intervention.  I do not think there is a need for regular oncologic surveillance.    I reviewed the nature of her tumor and its very low risk of  ever coming back.  We talked about what symptoms might suggest recurrence, such as abdominal pain, abdominal enlargement, or carcinoid symptoms.  I reassured her that the chance of any of these develop is extremely low.  I encouraged her not to become hypervigilant about abdominal symptoms but that she should bring to attention progressive symptoms that persist over weeks to months and do not have another good explanation.      Again, thank you for allowing me to participate in the care of your patient.        Sincerely,        Lane Reis MD

## 2021-05-07 ENCOUNTER — PRE VISIT (OUTPATIENT)
Dept: ONCOLOGY | Facility: CLINIC | Age: 26
End: 2021-05-07

## 2021-05-11 DIAGNOSIS — D3A.020 CARCINOID TUMOR OF APPENDIX, UNSPECIFIED WHETHER MALIGNANT (H): Primary | ICD-10-CM

## 2021-05-12 ENCOUNTER — TELEPHONE (OUTPATIENT)
Dept: FAMILY MEDICINE | Facility: CLINIC | Age: 26
End: 2021-05-12

## 2021-05-12 NOTE — TELEPHONE ENCOUNTER
I have obtained a fax number for [facility] [phone], I have faxed this over and also mailed a copy of referral to patient at providers request.       ----- Message from Dara Pickett MD sent at 5/11/2021 10:55 PM CDT -----  I entered oncology referral to [facility].   Please print and send copy of this referral to Tanya.  Also fax a copy to [facility] oncology department.    Thanks very much    Dara Pickett MD  Internal Medicine/Pediatrics

## 2021-05-25 ENCOUNTER — RECORDS - HEALTHEAST (OUTPATIENT)
Dept: ADMINISTRATIVE | Facility: CLINIC | Age: 26
End: 2021-05-25

## 2021-05-26 ENCOUNTER — RECORDS - HEALTHEAST (OUTPATIENT)
Dept: ADMINISTRATIVE | Facility: CLINIC | Age: 26
End: 2021-05-26

## 2021-09-03 DIAGNOSIS — N94.6 DYSMENORRHEA: ICD-10-CM

## 2021-09-03 RX ORDER — ACETAMINOPHEN AND CODEINE PHOSPHATE 120; 12 MG/5ML; MG/5ML
0.35 SOLUTION ORAL DAILY
Qty: 28 TABLET | Refills: 0 | Status: SHIPPED | OUTPATIENT
Start: 2021-09-03 | End: 2021-10-08

## 2021-09-03 NOTE — TELEPHONE ENCOUNTER
Norethindrone (Norlyda) 0.35mg 28day    Last Office Visit: 8/9/19  Future [facility] Appointments: None  Medication last refilled: 10/2/20 #84 with 3 refill(s)    Vital Signs 4/19/2021 4/19/2021 4/19/2021   Systolic 109 108 112   Diastolic 69 62 74     Prescription denied.  Patient has enough refills to get her through October.  Patient is overdue to be seen.    [employer] message sent to patient to call and schedule appointment.    Amy Fenton, RN, BSN

## 2021-09-04 ENCOUNTER — HEALTH MAINTENANCE LETTER (OUTPATIENT)
Age: 26
End: 2021-09-04

## 2021-10-08 ENCOUNTER — OFFICE VISIT (OUTPATIENT)
Dept: FAMILY MEDICINE | Facility: CLINIC | Age: 26
End: 2021-10-08
Payer: COMMERCIAL

## 2021-10-08 VITALS
TEMPERATURE: 97.1 F | RESPIRATION RATE: 13 BRPM | OXYGEN SATURATION: 99 % | HEIGHT: 66 IN | WEIGHT: 135 LBS | HEART RATE: 76 BPM | DIASTOLIC BLOOD PRESSURE: 80 MMHG | BODY MASS INDEX: 21.69 KG/M2 | SYSTOLIC BLOOD PRESSURE: 127 MMHG

## 2021-10-08 DIAGNOSIS — Z12.4 PAP SMEAR FOR CERVICAL CANCER SCREENING: Primary | ICD-10-CM

## 2021-10-08 DIAGNOSIS — J45.20 MILD INTERMITTENT ASTHMA WITHOUT COMPLICATION: ICD-10-CM

## 2021-10-08 DIAGNOSIS — L20.82 FLEXURAL ECZEMA: ICD-10-CM

## 2021-10-08 DIAGNOSIS — N94.6 DYSMENORRHEA: ICD-10-CM

## 2021-10-08 DIAGNOSIS — D3A.020 BENIGN CARCINOID TUMOR OF APPENDIX (H): ICD-10-CM

## 2021-10-08 DIAGNOSIS — F43.23 ADJUSTMENT DISORDER WITH MIXED ANXIETY AND DEPRESSED MOOD: ICD-10-CM

## 2021-10-08 PROCEDURE — G0145 SCR C/V CYTO,THINLAYER,RESCR: HCPCS | Performed by: INTERNAL MEDICINE

## 2021-10-08 RX ORDER — FLUOXETINE 10 MG/1
10 CAPSULE ORAL DAILY
Qty: 30 CAPSULE | Refills: 0 | Status: CANCELLED | OUTPATIENT
Start: 2021-10-08

## 2021-10-08 RX ORDER — ALBUTEROL SULFATE 90 UG/1
AEROSOL, METERED RESPIRATORY (INHALATION)
Qty: 18 G | Refills: 1 | Status: SHIPPED | OUTPATIENT
Start: 2021-10-08 | End: 2022-10-07

## 2021-10-08 RX ORDER — ACETAMINOPHEN AND CODEINE PHOSPHATE 120; 12 MG/5ML; MG/5ML
0.35 SOLUTION ORAL DAILY
Qty: 84 TABLET | Refills: 3 | Status: SHIPPED | OUTPATIENT
Start: 2021-10-08 | End: 2022-10-07

## 2021-10-08 ASSESSMENT — MIFFLIN-ST. JEOR: SCORE: 1378.86

## 2021-10-08 NOTE — PATIENT INSTRUCTIONS
1% cortisone ointment or cream  Mix with moisturizing cream and apply twice daily , at least once at bedtime

## 2021-10-08 NOTE — PROGRESS NOTES
Tanya Pearson is a 26 year old female here for the following issues:    Pap smear  Tanya is a 25 yo woman, due for routine pap smear.     OB/GYN HISTORY:  LMP: Patient's last menstrual period was 09/10/2021 (approximate).  Can have flow more than a week, heaviest flow, changing protection q 2 h    No longer getting migraines with use of OCP  Hx abnormal pap?  no  STD hx?  No concerns, no partner  cycle length:  Irregular q 2 months  dysmenorrhea/PMS:  First day of menses  G 0   P 0   A 0    Adjustment disorder with mixed anxiety and depressed mood  Tanya had been prescribed fluoxetine, 10mg daily dose in Dec 2020. She is not taking this medication.  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 Fortunately it was contained and surgery was definitive treatment. No follow up is indicated.   PHQ 2/22/2019 9/9/2020 10/11/2021   PHQ-9 Total Score 0 5 2   Q9: Thoughts of better off dead/self-harm past 2 weeks Not at all Not at all Not at all     SMITA-7 SCORE 2/22/2019 9/9/2020 10/11/2021   Total Score 1 12 7     Dysmenorrhea  Tanya is on Micronor OCP for hx of dysmenorrhea. Menses have improved and she no longer has migraine headaches. Non smoker. Not sexually active. She is requesting medication refills.     Benign carcinoid tumor of appendix  Tanya had surgery for acute appendicitis in April 2021. Incidental finding on pathology report was a carcinoid tumor. Surgical margin was free of tumor. Followup CT scan on 5/1/2021 done locally did not reveal any clear evidence of residual/recurrent/metastatic disease.   Tanya was evaluated at the [facility] and then sought a second opinion at [facility]. This was completed 9/13/21. They agreed with original recommendations from Dr. Reis, no further intervention or surveillance needed.     Mild intermittent asthma without complication  Tanya has mild intermittent asthma. Triggers are upper respiratory infections, dust mites, animal dander and cold  "air. She is requesting an inhaler be sent to the pharmacy and held until needed.      ACT Total Scores 6/12/2019 9/9/2020 10/11/2021   ACT TOTAL SCORE (Goal Greater than or Equal to 20) 25 25 25   In the past 12 months, how many times did you visit the emergency room for your asthma without being admitted to the hospital? 0 0 0   In the past 12 months, how many times were you hospitalized overnight because of your asthma? 0 0 0     Rash  Tanya has a dry itchy patch of skin at the crease of her left elbow. It seemed to start after her surgery, at the location where IVs were placed.     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  Etoh: 4-6 drinks on weekend evenings      Patient Active Problem List   Diagnosis     Migraine with aura and without status migrainosus, not intractable     Mild intermittent asthma without complication     Dysmenorrhea     Generalized anxiety disorder     Raynaud's disease     Adjustment disorder with mixed anxiety and depressed mood     Carcinoid tumor       Current Outpatient Medications   Medication Sig Dispense Refill     albuterol (PROAIR HFA/PROVENTIL HFA/VENTOLIN HFA) 108 (90 Base) MCG/ACT inhaler Inhale 2 puffs into the lungs every 6 hours as needed for shortness of breath / dyspnea or wheezing 1 Inhaler 3     ibuprofen (ADVIL/MOTRIN) 600 MG tablet Take 1 tablet (600 mg) by mouth every 6 hours as needed for moderate pain 30 tablet 0     norethindrone (MICRONOR) 0.35 MG tablet Take 1 tablet (0.35 mg) by mouth daily .  No further refills until seen in clinic. 28 tablet 0       Allergies   Allergen Reactions     Amoxicillin Rash        EXAM  /80   Pulse 76   Temp 97.1  F (36.2  C)   Resp 13   Ht 1.676 m (5' 5.98\")   Wt 61.2 kg (135 lb)   LMP 09/10/2021 (Approximate)   SpO2 99%   Breastfeeding No   BMI 21.80 kg/m    Gen: Alert, pleasant, NAD  COR: S1,S2, no murmur  Lungs: CTA bilaterally, no rhonchi, wheezes or rales  : External genitalia without " lesions, cervix normal, pap easily obtained, no abnormal discharge, bimanual exam deferred  Skin: dry scaly skin at left antecubital fossa, right arm normal.       Assessment:  (Z12.4) Pap smear for cervical cancer screening  (primary encounter diagnosis)  Comment: 25 yo woman in good health, normal exam  Plan: Gynecologic Cytology (PAP Smear), HPV Hold (Lab        Only)        If pap is normal, then repeat in 3 yrs    (F43.23) Adjustment disorder with mixed anxiety and depressed mood  Comment: no current medications , she reports mood is stable  Plan: no need for medication at this time. Recommend cutting back on Etoh consumption.    (N94.6) Dysmenorrhea  Comment: on OCP, Micronor for dysmenorrhea, migraines less frequent with this medication  Plan: norethindrone (MICRONOR) 0.35 MG tablet        Refilled medication x 1 yr    (D3A.020) Benign carcinoid tumor of appendix  Comment: incidental finding with appendectomy in April 2021, medical record reviewed with Tanya  Plan: no further intervention indicated    (J45.20) Mild intermittent asthma without complication  Comment: doing very well, no current need for rescue inhaler  Plan: albuterol (PROAIR HFA/PROVENTIL HFA/VENTOLIN         HFA) 108 (90 Base) MCG/ACT inhaler, Asthma         Action Plan (AAP)        Discussed AAP, refilled medication in pharmacy    (L20.82) Flexural eczema  Comment: left antecubital area  Plan: recommend moisturizing with cream, may use 1% hydrocotisone ointment once or twice daily.    Dara Pickett MD  Internal Medicine/Pediatrics

## 2021-10-08 NOTE — NURSING NOTE
"26 year old  Chief Complaint   Patient presents with     Gyn Exam     and refill birth control medication       Blood pressure 127/80, pulse 76, temperature 97.1  F (36.2  C), resp. rate 13, height 1.676 m (5' 5.98\"), weight 61.2 kg (135 lb), last menstrual period 09/10/2021, SpO2 99 %, not currently breastfeeding. Body mass index is 21.8 kg/m .  Patient Active Problem List   Diagnosis     Migraine with aura and without status migrainosus, not intractable     Mild intermittent asthma without complication     Dysmenorrhea     Generalized anxiety disorder     Raynaud's disease     Adjustment disorder with mixed anxiety and depressed mood     Carcinoid tumor       Wt Readings from Last 2 Encounters:   10/08/21 61.2 kg (135 lb)   04/19/21 62.2 kg (137 lb 2 oz)     BP Readings from Last 3 Encounters:   10/08/21 127/80   04/19/21 112/74   09/09/20 107/69         Current Outpatient Medications   Medication     albuterol (PROAIR HFA/PROVENTIL HFA/VENTOLIN HFA) 108 (90 Base) MCG/ACT inhaler     FLUoxetine (PROZAC) 10 MG capsule     ibuprofen (ADVIL/MOTRIN) 600 MG tablet     norethindrone (MICRONOR) 0.35 MG tablet     oxyCODONE (ROXICODONE) 5 MG tablet     SENNA-docusate sodium (SENNA S) 8.6-50 MG tablet     No current facility-administered medications for this visit.       Social History     Tobacco Use     Smoking status: Never Smoker     Smokeless tobacco: Never Used   Substance Use Topics     Alcohol use: No     Alcohol/week: 0.0 standard drinks     Drug use: No       Health Maintenance Due   Topic Date Due     PREVENTIVE CARE VISIT  Never done     CHLAMYDIA SCREENING  Never done     ASTHMA ACTION PLAN  Never done     ADVANCE CARE PLANNING  Never done     Pneumococcal Vaccine: Pediatrics (0 to 5 Years) and At-Risk Patients (6 to 64 Years) (1 of 2 - PPSV23) Never done     HIV SCREENING  Never done     HEPATITIS C SCREENING  Never done     DTAP/TDAP/TD IMMUNIZATION (7 - Td or Tdap) 07/30/2019     ASTHMA CONTROL TEST  " 03/09/2021     INFLUENZA VACCINE (1) 09/01/2021       Lab Results   Component Value Date    PAP NIL 05/31/2019 October 8, 2021 9:38 AM

## 2021-10-08 NOTE — LETTER
My Asthma Action Plan    Name: Tanya Pearson   YOB: 1995  Date: 10/8/2021   My doctor: Dara Pickett MD   My clinic: [facility]        My Rescue Medicine:   Albuterol inhaler (Proair/Ventolin/Proventil HFA)  2-4 puffs EVERY 4 HOURS as needed. Use a spacer if recommended by your provider.   My Asthma Severity:   Intermittent / Exercise Induced  Know your asthma triggers: upper respiratory infections, dust mites, animal dander and cold air  upper respiratory infections          GREEN ZONE   Good Control    I feel good    No cough or wheeze    Can work, sleep and play without asthma symptoms       Take your asthma control medicine every day.     1. If exercise triggers your asthma, take your rescue medication    15 minutes before exercise or sports, and    During exercise if you have asthma symptoms  2. Spacer to use with inhaler: If you have a spacer, make sure to use it with your inhaler             YELLOW ZONE Getting Worse  I have ANY of these:    I do not feel good    Cough or wheeze    Chest feels tight    Wake up at night   1. Keep taking your Green Zone medications  2. Start taking your rescue medicine:    every 20 minutes for up to 1 hour. Then every 4 hours for 24-48 hours.  3. If you stay in the Yellow Zone for more than 12-24 hours, contact your doctor.  4. If you do not return to the Green Zone in 12-24 hours or you get worse, start taking your oral steroid medicine if prescribed by your provider.           RED ZONE Medical Alert - Get Help  I have ANY of these:    I feel awful    Medicine is not helping    Breathing getting harder    Trouble walking or talking    Nose opens wide to breathe       1. Take your rescue medicine NOW  2. If your provider has prescribed an oral steroid medicine, start taking it NOW  3. Call your doctor NOW  4. If you are still in the Red Zone after 20 minutes and you have not reached your doctor:    Take your rescue medicine again and    Call  911 or go to the emergency room right away    See your regular doctor within 2 weeks of an Emergency Room or Urgent Care visit for follow-up treatment.          Annual Reminders:  Meet with Asthma Educator,  Flu Shot in the Fall, consider Pneumonia Vaccination for patients with asthma (aged 19 and older).    Ph[facility]: [id number] [place], [place] - [address] AT [facility]    Electronically signed by Dara Pickett MD   Date: 10/08/21                    Asthma Triggers  How To Control Things That Make Your Asthma Worse    Triggers are things that make your asthma worse.  Look at the list below to help you find your triggers and   what you can do about them. You can help prevent asthma flare-ups by staying away from your triggers.      Trigger                                                          What you can do   Cigarette Smoke  Tobacco smoke can make asthma worse. Do not allow smoking in your home, car or around you.  Be sure no one smokes at a child s day care or school.  If you smoke, ask your health care provider for ways to help you quit.  Ask family members to quit too.  Ask your health care provider for a referral to Quit Plan to help you quit smoking, or call 4-436-834-PLAN.     Colds, Flu, Bronchitis  These are common triggers of asthma. Wash your hands often.  Don t touch your eyes, nose or mouth.  Get a flu shot every year.     Dust Mites  These are tiny bugs that live in cloth or carpet. They are too small to see. Wash sheets and blankets in hot water every week.   Encase pillows and mattress in dust mite proof covers.  Avoid having carpet if you can. If you have carpet, vacuum weekly.   Use a dust mask and HEPA vacuum.   Pollen and Outdoor Mold  Some people are allergic to trees, grass, or weed pollen, or molds. Try to keep your windows closed.  Limit time out doors when pollen count is high.   Ask you health care provider about taking medicine during  allergy season.     Animal Dander  Some people are allergic to skin flakes, urine or saliva from pets with fur or feathers. Keep pets with fur or feathers out of your home.    If you can t keep the pet outdoors, then keep the pet out of your bedroom.  Keep the bedroom door closed.  Keep pets off cloth furniture and away from stuffed toys.     Mice, Rats, and Cockroaches  Some people are allergic to the waste from these pests.   Cover food and garbage.  Clean up spills and food crumbs.  Store grease in the refrigerator.   Keep food out of the bedroom.   Indoor Mold  This can be a trigger if your home has high moisture. Fix leaking faucets, pipes, or other sources of water.   Clean moldy surfaces.  Dehumidify basement if it is damp and smelly.   Smoke, Strong Odors, and Sprays  These can reduce air quality. Stay away from strong odors and sprays, such as perfume, powder, hair spray, paints, smoke incense, paint, cleaning products, candles and new carpet.   Exercise or Sports  Some people with asthma have this trigger. Be active!  Ask your doctor about taking medicine before sports or exercise to prevent symptoms.    Warm up for 5-10 minutes before and after sports or exercise.     Other Triggers of Asthma  Cold air:  Cover your nose and mouth with a scarf.  Sometimes laughing or crying can be a trigger.  Some medicines and food can trigger asthma.

## 2021-10-11 ASSESSMENT — ANXIETY QUESTIONNAIRES
GAD7 TOTAL SCORE: 7
1. FEELING NERVOUS, ANXIOUS, OR ON EDGE: MORE THAN HALF THE DAYS
2. NOT BEING ABLE TO STOP OR CONTROL WORRYING: SEVERAL DAYS
7. FEELING AFRAID AS IF SOMETHING AWFUL MIGHT HAPPEN: NOT AT ALL
3. WORRYING TOO MUCH ABOUT DIFFERENT THINGS: MORE THAN HALF THE DAYS
6. BECOMING EASILY ANNOYED OR IRRITABLE: SEVERAL DAYS
IF YOU CHECKED OFF ANY PROBLEMS ON THIS QUESTIONNAIRE, HOW DIFFICULT HAVE THESE PROBLEMS MADE IT FOR YOU TO DO YOUR WORK, TAKE CARE OF THINGS AT HOME, OR GET ALONG WITH OTHER PEOPLE: NOT DIFFICULT AT ALL
5. BEING SO RESTLESS THAT IT IS HARD TO SIT STILL: NOT AT ALL

## 2021-10-11 ASSESSMENT — PATIENT HEALTH QUESTIONNAIRE - PHQ9
5. POOR APPETITE OR OVEREATING: SEVERAL DAYS
SUM OF ALL RESPONSES TO PHQ QUESTIONS 1-9: 2

## 2021-10-12 LAB
BKR LAB AP GYN ADEQUACY: ABNORMAL
BKR LAB AP GYN INTERPRETATION: ABNORMAL
BKR LAB AP HPV REFLEX: ABNORMAL
BKR LAB AP LMP: ABNORMAL
BKR LAB AP PREVIOUS ABNORMAL: ABNORMAL
PATH REPORT.COMMENTS IMP SPEC: ABNORMAL
PATH REPORT.RELEVANT HX SPEC: ABNORMAL

## 2021-10-12 ASSESSMENT — ANXIETY QUESTIONNAIRES: GAD7 TOTAL SCORE: 7

## 2021-10-12 ASSESSMENT — ASTHMA QUESTIONNAIRES: ACT_TOTALSCORE: 25

## 2021-10-13 LAB
HUMAN PAPILLOMA VIRUS 16 DNA: NEGATIVE
HUMAN PAPILLOMA VIRUS 18 DNA: NEGATIVE
HUMAN PAPILLOMA VIRUS FINAL DIAGNOSIS: NORMAL
HUMAN PAPILLOMA VIRUS OTHER HR: NEGATIVE

## 2022-02-19 ENCOUNTER — HEALTH MAINTENANCE LETTER (OUTPATIENT)
Age: 27
End: 2022-02-19

## 2022-07-14 ENCOUNTER — OFFICE VISIT (OUTPATIENT)
Dept: URGENT CARE | Facility: URGENT CARE | Age: 27
End: 2022-07-14
Payer: COMMERCIAL

## 2022-07-14 VITALS
OXYGEN SATURATION: 95 % | WEIGHT: 133 LBS | BODY MASS INDEX: 21.48 KG/M2 | DIASTOLIC BLOOD PRESSURE: 81 MMHG | SYSTOLIC BLOOD PRESSURE: 124 MMHG | TEMPERATURE: 99.5 F | HEART RATE: 82 BPM

## 2022-07-14 DIAGNOSIS — R07.0 THROAT PAIN: ICD-10-CM

## 2022-07-14 DIAGNOSIS — J06.9 VIRAL URI WITH COUGH: Primary | ICD-10-CM

## 2022-07-14 LAB
DEPRECATED S PYO AG THROAT QL EIA: NEGATIVE
GROUP A STREP BY PCR: NOT DETECTED
SARS-COV-2 RNA RESP QL NAA+PROBE: NEGATIVE

## 2022-07-14 PROCEDURE — 87651 STREP A DNA AMP PROBE: CPT | Performed by: FAMILY MEDICINE

## 2022-07-14 PROCEDURE — U0003 INFECTIOUS AGENT DETECTION BY NUCLEIC ACID (DNA OR RNA); SEVERE ACUTE RESPIRATORY SYNDROME CORONAVIRUS 2 (SARS-COV-2) (CORONAVIRUS DISEASE [COVID-19]), AMPLIFIED PROBE TECHNIQUE, MAKING USE OF HIGH THROUGHPUT TECHNOLOGIES AS DESCRIBED BY CMS-2020-01-R: HCPCS | Performed by: FAMILY MEDICINE

## 2022-07-14 PROCEDURE — 99213 OFFICE O/P EST LOW 20 MIN: CPT | Mod: CS | Performed by: FAMILY MEDICINE

## 2022-07-14 PROCEDURE — U0005 INFEC AGEN DETEC AMPLI PROBE: HCPCS | Performed by: FAMILY MEDICINE

## 2022-07-14 RX ORDER — BENZONATATE 100 MG/1
100 CAPSULE ORAL 3 TIMES DAILY PRN
Qty: 21 CAPSULE | Refills: 1 | Status: SHIPPED | OUTPATIENT
Start: 2022-07-14 | End: 2022-10-31

## 2022-07-14 NOTE — PROGRESS NOTES
Assessment & Plan     Throat pain  - Streptococcus A Rapid Screen w/Reflex to PCR - Clinic Collect  - Symptomatic; Unknown COVID-19 Virus (Coronavirus) by PCR Nose    Viral URI with cough  - benzonatate (TESSALON) 100 MG capsule  Dispense: 21 capsule; Refill: 1     Patient consents to PCR screening.   Clear lung exam, no evidence of abscess of her throat.   Stable vitals. Presumed viral URI at this time    See AVS summary for additional recommendations reviewed with patient during this visit.         Edis Wolf MD   [facility]    Subjective     Tanya is a 27 year old female who presents to clinic today for the following health issues:  Chief Complaint   Patient presents with     Urgent Care     Cough     Dry cough x1week, worse at night and mornings, headaches, sore throat, negative covid on Tuesday.     HPI    Patient has not had COVID in last 6 months  Some throat discomfort but able to swallow. NO fevers. No chest pain or shortness of breath.   Benadryl and dayquil for symptoms. No facial discomfort or pressure.     Home COVID test 2 days ago negative.       3 doses of COVID vaccination.     Patient Active Problem List    Diagnosis Date Noted     Carcinoid tumor 05/03/2021     Priority: Medium     Adjustment disorder with mixed anxiety and depressed mood 09/28/2020     Priority: Medium     Raynaud's disease 08/09/2017     Priority: Medium     Migraine with aura and without status migrainosus, not intractable 12/13/2015     Priority: Medium     Mild intermittent asthma without complication 12/13/2015     Priority: Medium     Dysmenorrhea 12/13/2015     Priority: Medium     Generalized anxiety disorder 12/13/2015     Priority: Medium       Current Outpatient Medications   Medication     benzonatate (TESSALON) 100 MG capsule     norethindrone (MICRONOR) 0.35 MG tablet     albuterol (PROAIR HFA/PROVENTIL HFA/VENTOLIN HFA) 108 (90 Base) MCG/ACT inhaler     ibuprofen (ADVIL/MOTRIN) 600 MG tablet      No current facility-administered medications for this visit.         Objective    /81   Pulse 82   Temp 99.5  F (37.5  C) (Temporal)   Wt 60.3 kg (133 lb)   SpO2 95%   BMI 21.48 kg/m    Physical Exam     CV: RRR no m/r/g  Pulm: clear bilaterally  Throat: no enlarged tonsils  Neck: no enlarged nodes    No results found for any visits on 07/14/22.                  The use of Dragon/WatchParty dictation services may have been used to construct the content in this note; any grammatical or spelling errors are non-intentional. Please contact the author of this note directly if you are in need of any clarification.

## 2022-07-14 NOTE — PATIENT INSTRUCTIONS
Symptoms should improve over the next few days assuming this is a viral illness      If things worsen at any point please seek medical attention       Drink 100 ounces of water a day to keep hydrated      For cough (can take all of them together):   - Dextromethorphan 'DM' (over the counter - can be found in Robitussin/Delsym/generic cough meds)  - Honey: lozenges/cough drops or mix honey in warm water  - Tessalon/Benzonatate (prescription) every 8 hours as needed

## 2022-07-14 NOTE — LETTER
July 14, 2022      Tanya Pearson  [address] [place] [address]        To Whom It May Concern:    Tanya Pearson was seen in our clinic for an illness please excuse from missed work duties on 7/14/22  Sincerely,        Edis Wolf MD

## 2022-09-06 ENCOUNTER — HOSPITAL ENCOUNTER (EMERGENCY)
Facility: HOSPITAL | Age: 27
Discharge: HOME | End: 2022-09-06
Attending: EMERGENCY MEDICINE | Admitting: EMERGENCY MEDICINE
Payer: COMMERCIAL

## 2022-09-06 ENCOUNTER — APPOINTMENT (OUTPATIENT)
Dept: CT IMAGING | Facility: HOSPITAL | Age: 27
End: 2022-09-06
Attending: EMERGENCY MEDICINE
Payer: COMMERCIAL

## 2022-09-06 VITALS
RESPIRATION RATE: 14 BRPM | DIASTOLIC BLOOD PRESSURE: 81 MMHG | BODY MASS INDEX: 21.69 KG/M2 | SYSTOLIC BLOOD PRESSURE: 124 MMHG | TEMPERATURE: 98.6 F | HEART RATE: 68 BPM | HEIGHT: 66 IN | WEIGHT: 135 LBS | OXYGEN SATURATION: 99 %

## 2022-09-06 DIAGNOSIS — R10.31 ABDOMINAL PAIN, RIGHT LOWER QUADRANT: ICD-10-CM

## 2022-09-06 LAB
ALBUMIN SERPL-MCNC: 4.5 G/DL (ref 3.5–5)
ALBUMIN UR-MCNC: NEGATIVE MG/DL
ALP SERPL-CCNC: 58 U/L (ref 45–120)
ALT SERPL W P-5'-P-CCNC: 19 U/L (ref 0–45)
ANION GAP SERPL CALCULATED.3IONS-SCNC: 10 MMOL/L (ref 5–18)
APPEARANCE UR: CLEAR
AST SERPL W P-5'-P-CCNC: 20 U/L (ref 0–40)
BASOPHILS # BLD AUTO: 0 10E3/UL (ref 0–0.2)
BASOPHILS NFR BLD AUTO: 1 %
BILIRUB SERPL-MCNC: 1.4 MG/DL (ref 0–1)
BILIRUB UR QL STRIP: NEGATIVE
BUN SERPL-MCNC: 10 MG/DL (ref 8–22)
CALCIUM SERPL-MCNC: 9.5 MG/DL (ref 8.5–10.5)
CHLORIDE BLD-SCNC: 108 MMOL/L (ref 98–107)
CO2 SERPL-SCNC: 24 MMOL/L (ref 22–31)
COLOR UR AUTO: ABNORMAL
CREAT SERPL-MCNC: 0.71 MG/DL (ref 0.6–1.1)
EOSINOPHIL # BLD AUTO: 0.1 10E3/UL (ref 0–0.7)
EOSINOPHIL NFR BLD AUTO: 1 %
ERYTHROCYTE [DISTWIDTH] IN BLOOD BY AUTOMATED COUNT: 11.9 % (ref 10–15)
GFR SERPL CREATININE-BSD FRML MDRD: >90 ML/MIN/1.73M2
GLUCOSE BLD-MCNC: 87 MG/DL (ref 70–125)
GLUCOSE UR STRIP-MCNC: NEGATIVE MG/DL
HCG UR QL: NEGATIVE
HCT VFR BLD AUTO: 44.6 % (ref 35–47)
HGB BLD-MCNC: 15.1 G/DL (ref 11.7–15.7)
HGB UR QL STRIP: NEGATIVE
HYALINE CASTS: 1 /LPF
IMM GRANULOCYTES # BLD: 0 10E3/UL
IMM GRANULOCYTES NFR BLD: 0 %
KETONES UR STRIP-MCNC: NEGATIVE MG/DL
LEUKOCYTE ESTERASE UR QL STRIP: NEGATIVE
LIPASE SERPL-CCNC: 20 U/L (ref 0–52)
LYMPHOCYTES # BLD AUTO: 2.1 10E3/UL (ref 0.8–5.3)
LYMPHOCYTES NFR BLD AUTO: 33 %
MCH RBC QN AUTO: 32.5 PG (ref 26.5–33)
MCHC RBC AUTO-ENTMCNC: 33.9 G/DL (ref 31.5–36.5)
MCV RBC AUTO: 96 FL (ref 78–100)
MONOCYTES # BLD AUTO: 0.6 10E3/UL (ref 0–1.3)
MONOCYTES NFR BLD AUTO: 9 %
MUCOUS THREADS #/AREA URNS LPF: PRESENT /LPF
NEUTROPHILS # BLD AUTO: 3.6 10E3/UL (ref 1.6–8.3)
NEUTROPHILS NFR BLD AUTO: 56 %
NITRATE UR QL: NEGATIVE
NRBC # BLD AUTO: 0 10E3/UL
NRBC BLD AUTO-RTO: 0 /100
PH UR STRIP: 6 [PH] (ref 5–7)
PLATELET # BLD AUTO: 193 10E3/UL (ref 150–450)
POTASSIUM BLD-SCNC: 3.4 MMOL/L (ref 3.5–5)
PROT SERPL-MCNC: 7.4 G/DL (ref 6–8)
RBC # BLD AUTO: 4.65 10E6/UL (ref 3.8–5.2)
RBC URINE: 1 /HPF
SODIUM SERPL-SCNC: 142 MMOL/L (ref 136–145)
SP GR UR STRIP: 1.02 (ref 1–1.03)
SQUAMOUS EPITHELIAL: 4 /HPF
UROBILINOGEN UR STRIP-MCNC: <2 MG/DL
WBC # BLD AUTO: 6.5 10E3/UL (ref 4–11)
WBC URINE: 1 /HPF

## 2022-09-06 PROCEDURE — 80053 COMPREHEN METABOLIC PANEL: CPT | Performed by: EMERGENCY MEDICINE

## 2022-09-06 PROCEDURE — 96375 TX/PRO/DX INJ NEW DRUG ADDON: CPT

## 2022-09-06 PROCEDURE — 258N000003 HC RX IP 258 OP 636: Performed by: EMERGENCY MEDICINE

## 2022-09-06 PROCEDURE — 74176 CT ABD & PELVIS W/O CONTRAST: CPT

## 2022-09-06 PROCEDURE — 81025 URINE PREGNANCY TEST: CPT | Performed by: EMERGENCY MEDICINE

## 2022-09-06 PROCEDURE — 85025 COMPLETE CBC W/AUTO DIFF WBC: CPT | Performed by: EMERGENCY MEDICINE

## 2022-09-06 PROCEDURE — 99285 EMERGENCY DEPT VISIT HI MDM: CPT | Mod: 25

## 2022-09-06 PROCEDURE — 96361 HYDRATE IV INFUSION ADD-ON: CPT

## 2022-09-06 PROCEDURE — 96374 THER/PROPH/DIAG INJ IV PUSH: CPT

## 2022-09-06 PROCEDURE — 83690 ASSAY OF LIPASE: CPT | Performed by: EMERGENCY MEDICINE

## 2022-09-06 PROCEDURE — 81001 URINALYSIS AUTO W/SCOPE: CPT | Performed by: EMERGENCY MEDICINE

## 2022-09-06 PROCEDURE — 250N000011 HC RX IP 250 OP 636: Performed by: EMERGENCY MEDICINE

## 2022-09-06 PROCEDURE — 36415 COLL VENOUS BLD VENIPUNCTURE: CPT | Performed by: EMERGENCY MEDICINE

## 2022-09-06 PROCEDURE — 82040 ASSAY OF SERUM ALBUMIN: CPT | Performed by: EMERGENCY MEDICINE

## 2022-09-06 RX ORDER — ONDANSETRON 2 MG/ML
4 INJECTION INTRAMUSCULAR; INTRAVENOUS EVERY 30 MIN PRN
Status: DISCONTINUED | OUTPATIENT
Start: 2022-09-06 | End: 2022-09-06 | Stop reason: HOSPADM

## 2022-09-06 RX ORDER — KETOROLAC TROMETHAMINE 15 MG/ML
15 INJECTION, SOLUTION INTRAMUSCULAR; INTRAVENOUS ONCE
Status: COMPLETED | OUTPATIENT
Start: 2022-09-06 | End: 2022-09-06

## 2022-09-06 RX ORDER — SODIUM CHLORIDE 9 MG/ML
INJECTION, SOLUTION INTRAVENOUS CONTINUOUS
Status: DISCONTINUED | OUTPATIENT
Start: 2022-09-06 | End: 2022-09-06 | Stop reason: HOSPADM

## 2022-09-06 RX ADMIN — ONDANSETRON 4 MG: 2 INJECTION INTRAMUSCULAR; INTRAVENOUS at 05:18

## 2022-09-06 RX ADMIN — SODIUM CHLORIDE 1000 ML: 9 INJECTION, SOLUTION INTRAVENOUS at 05:17

## 2022-09-06 RX ADMIN — KETOROLAC TROMETHAMINE 15 MG: 15 INJECTION, SOLUTION INTRAMUSCULAR; INTRAVENOUS at 05:18

## 2022-09-06 ASSESSMENT — ENCOUNTER SYMPTOMS
NAUSEA: 1
DIARRHEA: 1
FEVER: 0
DYSURIA: 0
CHILLS: 0
HEMATURIA: 0
ABDOMINAL PAIN: 1
VOMITING: 1

## 2022-09-06 ASSESSMENT — ACTIVITIES OF DAILY LIVING (ADL): ADLS_ACUITY_SCORE: 33

## 2022-09-06 NOTE — Clinical Note
Tanya Pearson was seen and treated in our emergency department on 9/6/2022.  She may return to work on 09/07/2022.       If you have any questions or concerns, please don't hesitate to call.      Tom Mercado MD

## 2022-09-06 NOTE — ED PROVIDER NOTES
EMERGENCY DEPARTMENT ENCOUNTER      NAME: Tanya Pearson  AGE: 27 year old female  YOB: 1995  MRN: [id number]  EVALUATION DATE & TIME: 9/6/2022  4:49 AM    PCP: Dara Pickett    ED PROVIDER: Tom Mercado M.D.      Chief Complaint   Patient presents with     Abdominal Pain     Nausea, Vomiting, & Diarrhea       FINAL IMPRESSION:  1. Abdominal pain, right lower quadrant        ED COURSE & MEDICAL DECISION MAKING:    Pertinent Labs & Imaging studies reviewed. (See chart for details)  27 year old female presents to the Emergency Department for evaluation of abdominal pain.  Has right-sided abdominal pain.  This in the right mid to lower abdomen.  Has had her appendix out already.  Did consider ovarian pathology but really no tenderness down in the pelvic area.  Its higher than this.  Also considered kidney pathology such as kidney stone or urine infection.  Patient is not pregnant.  Urinalysis is normal.  White count is normal.  CT scan does not reveal a cause of her abdominal pain.  Feeling better after IV Toradol and IV fluids.  Patient we discharged home.  We will follow-up with primary.  Return for worsening symptoms.  No signs of obstruction or other serious pathology at this time.    4:57 AM I met with the patient to gather history and to perform my initial exam. I discussed the plan for care while in the Emergency Department. PPE worn: N95, eye protection, gloves.    At the conclusion of the encounter I discussed the results of all of the tests and the disposition. The questions were answered. The patient or family acknowledged understanding and was agreeable with the care plan.         MEDICATIONS GIVEN IN THE EMERGENCY:  Medications   0.9% sodium chloride BOLUS (1,000 mLs Intravenous New Bag 9/6/22 0517)     Followed by   sodium chloride 0.9% infusion (has no administration in time range)   ondansetron (ZOFRAN) injection 4 mg (4 mg Intravenous Given 9/6/22 0518)   ketorolac  (TORADOL) injection 15 mg (15 mg Intravenous Given 9/6/22 0518)       NEW PRESCRIPTIONS STARTED AT TODAY'S ER VISIT  New Prescriptions    No medications on file     =================================================================    HPI    Patient information was obtained from: patient    Use of : N/A    Tanya Pearson is a 27 year old female with a pertinent history of anxiety who presents to this ED by private car with  for evaluation of abdominal pain. Patient reports onset or right mid abdominal pain that came on suddenly yesterday at 8:00 AM. Patient reports that she was up around 2:00 AM on Friday morning (9/2) with episodes of nausea, vomiting and diarrhea after going to the [facility] on Thursday night. She has continued to have some nausea this morning with the pain, but has not had any more episodes of emesis. Patient denies any dysuria or hematuria, reports normal urination. She denies any chance of pregnancy. LMP approximately 3 weeks ago. Denies any abnormal vaginal bleeding or discharge. No fevers or chills.    Patient s/p laparoscopic appendectomy on 4/19/2021. She states that during her appendectomy, they found a tumor growing on or near her appendix, but states that it was too small for them to take out at the time. She denies any other complaints or concerns.      REVIEW OF SYSTEMS  Review of Systems   Constitutional: Negative for chills and fever.   Gastrointestinal: Positive for abdominal pain (R mid abdomen), diarrhea, nausea and vomiting (resolved).   Genitourinary: Negative.  Negative for dysuria, hematuria, vaginal bleeding and vaginal discharge.   All other systems reviewed and are negative.      PAST MEDICAL HISTORY:  Past Medical History:   Diagnosis Date     Uncomplicated asthma        PAST SURGICAL HISTORY:  Past Surgical History:   Procedure Laterality Date     LAPAROSCOPIC APPENDECTOMY N/A 4/19/2021    Procedure: APPENDECTOMY, LAPAROSCOPIC;  Surgeon:  "Doc Vazquez, DO;  Location: [facility] OR     NO HISTORY OF SURGERY         CURRENT MEDICATIONS:    Current Facility-Administered Medications   Medication     0.9% sodium chloride BOLUS    Followed by     sodium chloride 0.9% infusion     ondansetron (ZOFRAN) injection 4 mg     Current Outpatient Medications   Medication     albuterol (PROAIR HFA/PROVENTIL HFA/VENTOLIN HFA) 108 (90 Base) MCG/ACT inhaler     benzonatate (TESSALON) 100 MG capsule     ibuprofen (ADVIL/MOTRIN) 600 MG tablet     norethindrone (MICRONOR) 0.35 MG tablet       ALLERGIES:  Allergies   Allergen Reactions     Amoxicillin Rash       FAMILY HISTORY:  Family History   Problem Relation Age of Onset     Cancer Maternal Grandmother         skin cancer     Breast Cancer Paternal Grandmother 76       SOCIAL HISTORY:   Social History     Socioeconomic History     Marital status: Single     Number of children: 0   Occupational History     Occupation: working at [employer]   Tobacco Use     Smoking status: Current Every Day Smoker     Types: Vaping Device     Smokeless tobacco: Never Used   Substance and Sexual Activity     Alcohol use: Yes     Comment: 6     Drug use: No       VITALS:  /81   Pulse 69   Temp 98.6  F (37  C) (Oral)   Resp 16   Ht 1.676 m (5' 6\")   Wt 61.2 kg (135 lb)   LMP 08/16/2022 (Approximate)   SpO2 98%   Breastfeeding No   BMI 21.79 kg/m      PHYSICAL EXAM    Physical Exam  Constitutional:       General: She is not in acute distress.     Appearance: She is not diaphoretic.   HENT:      Head: Atraumatic.      Mouth/Throat:      Pharynx: No oropharyngeal exudate.   Eyes:      General: No scleral icterus.     Pupils: Pupils are equal, round, and reactive to light.   Cardiovascular:      Heart sounds: Normal heart sounds.   Pulmonary:      Effort: No respiratory distress.      Breath sounds: Normal breath sounds.   Abdominal:      Palpations: Abdomen is soft.      Tenderness: There is abdominal tenderness in the " right lower quadrant. There is no guarding or rebound.   Musculoskeletal:         General: No tenderness.   Skin:     General: Skin is warm.      Findings: No rash.   Neurological:      General: No focal deficit present.      Mental Status: She is alert.           LAB:  All pertinent labs reviewed and interpreted.  Labs Ordered and Resulted from Time of ED Arrival to Time of ED Departure   ROUTINE UA WITH MICROSCOPIC REFLEX TO CULTURE - Abnormal       Result Value    Color Urine Light Yellow      Appearance Urine Clear      Glucose Urine Negative      Bilirubin Urine Negative      Ketones Urine Negative      Specific Gravity Urine 1.021      Blood Urine Negative      pH Urine 6.0      Protein Albumin Urine Negative      Urobilinogen Urine <2.0      Nitrite Urine Negative      Leukocyte Esterase Urine Negative      Mucus Urine Present (*)     RBC Urine 1      WBC Urine 1      Squamous Epithelials Urine 4 (*)     Hyaline Casts Urine 1     COMPREHENSIVE METABOLIC PANEL - Abnormal    Sodium 142      Potassium 3.4 (*)     Chloride 108 (*)     Carbon Dioxide (CO2) 24      Anion Gap 10      Urea Nitrogen 10      Creatinine 0.71      Calcium 9.5      Glucose 87      Alkaline Phosphatase 58      AST 20      ALT 19      Protein Total 7.4      Albumin 4.5      Bilirubin Total 1.4 (*)     GFR Estimate >90     HCG QUALITATIVE URINE - Normal    hCG Urine Qualitative Negative     LIPASE - Normal    Lipase 20     CBC WITH PLATELETS AND DIFFERENTIAL    WBC Count 6.5      RBC Count 4.65      Hemoglobin 15.1      Hematocrit 44.6      MCV 96      MCH 32.5      MCHC 33.9      RDW 11.9      Platelet Count 193      % Neutrophils 56      % Lymphocytes 33      % Monocytes 9      % Eosinophils 1      % Basophils 1      % Immature Granulocytes 0      NRBCs per 100 WBC 0      Absolute Neutrophils 3.6      Absolute Lymphocytes 2.1      Absolute Monocytes 0.6      Absolute Eosinophils 0.1      Absolute Basophils 0.0      Absolute Immature  Granulocytes 0.0      Absolute NRBCs 0.0         RADIOLOGY:  Reviewed all pertinent imaging. Please see official radiology report.  CT Abdomen Pelvis w/o Contrast   Final Result   IMPRESSION:    1.  No renal, ureteral, or bladder calculi   2.   no acute intra-abdominal process identified.             I, Lazaro Pablo, am serving as a scribe to document services personally performed by Dr. Tom Mercado, based on my observation and the provider's statements to me. I, Tom Mercado MD attest that Lazaro Pablo is acting in a scribe capacity, has observed my performance of the services and has documented them in accordance with my direction.    Tom Mercado M.D.  Emergency Medicine  [facility] EMERGENCY DEPARTMENT  [facility] [facility] [address] [phone]  Dept: [phone]     Tom Mercado MD  09/06/22 0615

## 2022-09-06 NOTE — ED TRIAGE NOTES
Pt reports developing nausea, vomiting and diarrhea on Friday after she had spent time at the [facility] on Thursday. Pt reports waking up this morning with a RLQ abdominal pain.  Also reports nausea this morning but is no longer vomiting.

## 2022-10-07 ENCOUNTER — MYC MEDICAL ADVICE (OUTPATIENT)
Dept: FAMILY MEDICINE | Facility: CLINIC | Age: 27
End: 2022-10-07

## 2022-10-07 DIAGNOSIS — N94.6 DYSMENORRHEA: ICD-10-CM

## 2022-10-07 RX ORDER — ACETAMINOPHEN AND CODEINE PHOSPHATE 120; 12 MG/5ML; MG/5ML
0.35 SOLUTION ORAL DAILY
Qty: 28 TABLET | Refills: 0 | Status: SHIPPED | OUTPATIENT
Start: 2022-10-07 | End: 2022-10-31

## 2022-10-07 NOTE — TELEPHONE ENCOUNTER
Medication requested: norethindrone (MICRONOR) 0.35 MG tablet  Last office visit: 10/8/21  [facility] appointments: none  Medication last refilled: 10/8/21  Last qualifying labs:   BP Readings from Last 3 Encounters:   09/06/22 124/81   07/14/22 124/81   10/08/21 127/80     Medication is being filled for 1 time refill only due to:  Patient needs to be seen because it has been more than one year since last visit.    Clive ARMENTA, RN  10/07/22 3:41 PM

## 2022-10-16 ENCOUNTER — HEALTH MAINTENANCE LETTER (OUTPATIENT)
Age: 27
End: 2022-10-16

## 2022-10-31 ENCOUNTER — OFFICE VISIT (OUTPATIENT)
Dept: FAMILY MEDICINE | Facility: CLINIC | Age: 27
End: 2022-10-31
Payer: COMMERCIAL

## 2022-10-31 VITALS
DIASTOLIC BLOOD PRESSURE: 81 MMHG | SYSTOLIC BLOOD PRESSURE: 138 MMHG | HEIGHT: 67 IN | HEART RATE: 89 BPM | BODY MASS INDEX: 19.94 KG/M2 | OXYGEN SATURATION: 98 % | TEMPERATURE: 98 F | WEIGHT: 127.04 LBS

## 2022-10-31 DIAGNOSIS — K21.9 GASTROESOPHAGEAL REFLUX DISEASE, UNSPECIFIED WHETHER ESOPHAGITIS PRESENT: ICD-10-CM

## 2022-10-31 DIAGNOSIS — N94.6 DYSMENORRHEA: Primary | ICD-10-CM

## 2022-10-31 DIAGNOSIS — R10.31 RLQ ABDOMINAL PAIN: ICD-10-CM

## 2022-10-31 RX ORDER — ACETAMINOPHEN AND CODEINE PHOSPHATE 120; 12 MG/5ML; MG/5ML
0.35 SOLUTION ORAL DAILY
Qty: 90 TABLET | Refills: 3 | Status: SHIPPED | OUTPATIENT
Start: 2022-10-31 | End: 2024-08-08

## 2022-10-31 RX ORDER — OMEPRAZOLE 40 MG/1
40 CAPSULE, DELAYED RELEASE ORAL DAILY
Qty: 90 CAPSULE | Refills: 1 | Status: SHIPPED | OUTPATIENT
Start: 2022-10-31 | End: 2023-02-22

## 2022-10-31 ASSESSMENT — ASTHMA QUESTIONNAIRES: ACT_TOTALSCORE: 25

## 2022-10-31 NOTE — PROGRESS NOTES
"Tanya Pearson is a 27 year old female here for the following issues:    Dysmenorrhea   Tanya takes Micronor tablet daily for treatment of dysmenorrhea. She is consistent with this medication. She reports improvement in her migraines and denies any problems with the medication.    LMP about 2 months ago. She notes that she also had a recent long-lasting menstrual cycle prior to that.  Cramping none  Headaches: none    Partner: female partner  STD screening today? declines    BP Readings from Last 3 Encounters:   10/31/22 138/81   09/06/22 124/81   07/14/22 124/81     Asthma  Tanya has a history of mild intermittent asthma. She recalls that she received a prescription for an inhaler, but does not believe she ever picked it up. Her symptoms are well controlled.    ACT Total Scores 9/9/2020 10/11/2021 10/31/2022   ACT TOTAL SCORE (Goal Greater than or Equal to 20) 25 25 25   In the past 12 months, how many times did you visit the emergency room for your asthma without being admitted to the hospital? 0 0 0   In the past 12 months, how many times were you hospitalized overnight because of your asthma? 0 0 0         Abdominal Pain--RLQ  Tanya visited the ER on 9/6/2022 with abdominal pain and nausea. She notes that it felt similar to when she had appendicitis. She is s/p appendectomy. No constipation or diarrhea. She is unsure whether this was caused by eating something bad, [place]. Today, she woke up with sharp right-sided abdominal pain that she rates as 8/10 when coughing or laughing. She reports that walking and sitting up from a recumbent position also triggers this pain. Deep breaths trigger the pain to a much lesser degree than coughing or laughing.  Denies dysphagia, fever, hematochezia or mucus in the stool. She notes that she gets RLQ pain \"pretty often.\" Denies changes in urination. She has had a normal bowel movement and has passed gas today. She is fasting " "today.    GERD  Tanya endorses frequent heartburn. She takes 2 omeprazole tablets daily in the morning from her mother's prescription and an occasional Tums to address this. She drinks coffee, but not daily, and vapes. Denies black stools, denies dysphagia. She is asking if she needs to have an endoscopy.    EtOH: About 7/night 1-2 nights/week and 0-1 drinks/night the rest of the week    Patient Active Problem List   Diagnosis     Migraine with aura and without status migrainosus, not intractable     Mild intermittent asthma without complication     Dysmenorrhea     Generalized anxiety disorder     Raynaud's disease     Adjustment disorder with mixed anxiety and depressed mood     Carcinoid tumor       Current Outpatient Medications   Medication Sig Dispense Refill     norethindrone (MICRONOR) 0.35 MG tablet Take 1 tablet (0.35 mg) by mouth daily 90 tablet 3       Allergies   Allergen Reactions     Amoxicillin Rash        EXAM  /81   Pulse 89   Temp 98  F (36.7  C)   Ht 1.71 m (5' 7.32\")   Wt 57.6 kg (127 lb 0.6 oz)   LMP 08/31/2022 (Approximate)   SpO2 98%   BMI 19.71 kg/m    Gen: Alert, pleasant, NAD  COR: S1,S2, no murmur  Lungs: CTA bilaterally, no rhonchi, wheezes or rales  Abdomen: Tenderness with palpation at RLQ, lateral aspect, just above iliac crest. Paucity of bowel sounds, no high pitched sounds. Belly is soft, no R/G.      Assessment:  (N94.6) Dysmenorrhea  (primary encounter diagnosis)  Comment: previous hx of heavy menses with lots of cramping. Symptoms are well-controlled with current medication.  Plan: norethindrone (MICRONOR) 0.35 MG tablet        Medication refilled.    (K21.9) Gastroesophageal reflux disease, unspecified whether esophagitis present  Comment: Currently taking 40 mg omeprazole from her mom's prescription. EtOH use-- 7 shots on Friday and Saturday nights, occasional beer during the week.  Plan: omeprazole (PRILOSEC) 40 MG DR capsule        Start omeprazole 40 mg daily. " Recommended cutting back on alcohol. Consider endoscopy if not improving in 4-8 wks.    (R10.31) RLQ abdominal pain  Comment: Recent abdominal CT w/o contrast show a constipated bowel on the right side. Otherwise nothing to explain her RLQ pain  Plan: Recommend dulcolax suppository every other day for 3 doses. Notify me if RLQ pain persists. Consider CT with contrast to rule out SBO. To ER for escalating pain not managed at home.    HCM  She declines flu and Tdap boosters today.    41 minutes spend on the date of this encounter doing chart review, history and exam, documentation and further activities as noted above.     Dara Pickett MD  Internal Medicine/Pediatrics      I, Erica Cunningham, am serving as a scribe to document services personally performed by Dr. Dara Pickett, based on data collection and the provider's statements to me. Dr. Pickett has reviewed, edited, and approved the above note.

## 2022-11-15 ENCOUNTER — OFFICE VISIT (OUTPATIENT)
Dept: FAMILY MEDICINE | Facility: CLINIC | Age: 27
End: 2022-11-15
Payer: COMMERCIAL

## 2022-11-15 VITALS
SYSTOLIC BLOOD PRESSURE: 128 MMHG | TEMPERATURE: 99.4 F | HEART RATE: 91 BPM | DIASTOLIC BLOOD PRESSURE: 79 MMHG | OXYGEN SATURATION: 98 % | WEIGHT: 122 LBS | BODY MASS INDEX: 18.92 KG/M2

## 2022-11-15 DIAGNOSIS — A08.4 VIRAL GASTROENTERITIS: Primary | ICD-10-CM

## 2022-11-15 DIAGNOSIS — R11.10 VOMITING AND DIARRHEA: ICD-10-CM

## 2022-11-15 DIAGNOSIS — R19.7 DIARRHEA OF PRESUMED INFECTIOUS ORIGIN: ICD-10-CM

## 2022-11-15 DIAGNOSIS — R19.7 VOMITING AND DIARRHEA: ICD-10-CM

## 2022-11-15 LAB
FLUAV AG SPEC QL IA: NEGATIVE
FLUBV AG SPEC QL IA: NEGATIVE

## 2022-11-15 PROCEDURE — 99213 OFFICE O/P EST LOW 20 MIN: CPT

## 2022-11-15 PROCEDURE — 87804 INFLUENZA ASSAY W/OPTIC: CPT

## 2022-11-15 RX ORDER — ONDANSETRON 4 MG/1
4 TABLET, ORALLY DISINTEGRATING ORAL EVERY 8 HOURS PRN
Qty: 15 TABLET | Refills: 0 | Status: SHIPPED | OUTPATIENT
Start: 2022-11-15 | End: 2022-12-28

## 2022-11-15 ASSESSMENT — ENCOUNTER SYMPTOMS
RESPIRATORY NEGATIVE: 1
DIARRHEA: 1
ABDOMINAL PAIN: 1
CARDIOVASCULAR NEGATIVE: 1
FATIGUE: 1
VOMITING: 1
CHILLS: 1
NAUSEA: 1

## 2022-11-15 NOTE — LETTER
November 15, 2022      Tanya Pearson  [address] [place] [address]        To Whom It May Concern,     Tanya is to be excused from work 11/15/22, may return upon improvement of symptoms.       Sincerely,      BROOKE Rodriguez [facility]

## 2022-11-15 NOTE — PROGRESS NOTES
Assessment & Plan     Viral gastroenteritis    Diarrhea of presumed infectious origin  - Influenza A/B antigen    Vomiting and diarrhea  - ondansetron (ZOFRAN ODT) 4 MG ODT tab  Dispense: 15 tablet; Refill: 0     Flu (-)  Increase fluids with water, Pedialyte, Gatorade, or rehydrating beverages. Alternate Tylenol and Ibuprofen as needed for aches, pains or fever. Follow clear liquid to BRAT diet (bananas, rice, applesauce, toast) as needed for any upset stomach. Rest as much as possible. Follow up clinic if symptoms persist or worsen or you show signs of dehydration including decreased urination, lack of tears, dry mouth.     Return if symptoms worsen or fail to improve, for Follow up.    At the end of the encounter, I discussed results, diagnosis, medications. Discussed red flags for immediate return to clinic/ER, as well as indications for follow up if no improvement. Patient understood and agreed to plan. Patient was stable for discharge.    Subjective     Tanya is a 27 year old female who presents to clinic today for the following health issues:  Chief Complaint   Patient presents with     Diarrhea     Abdominal pain, vomiting, chills and sweats stared 11/14      Tanya presents with reports of abdominal pain, vomiting, diarrhea, chills and sweats x 1 day. She reports decreased appetite. Her appendix has been removed.           Review of Systems   Constitutional: Positive for chills and fatigue.   Respiratory: Negative.    Cardiovascular: Negative.    Gastrointestinal: Positive for abdominal pain, diarrhea, nausea and vomiting.   Skin: Negative.            Objective    /79 (BP Location: Right arm, Patient Position: Chair, Cuff Size: Adult Regular)   Pulse 91   Temp 99.4  F (37.4  C) (Tympanic)   Wt 55.3 kg (122 lb)   SpO2 98%   BMI 18.92 kg/m    Physical Exam  Constitutional:       Appearance: Normal appearance.   HENT:      Head: Normocephalic and atraumatic.   Cardiovascular:      Rate and Rhythm:  Normal rate and regular rhythm.      Heart sounds: Normal heart sounds.   Pulmonary:      Effort: Pulmonary effort is normal.      Breath sounds: Normal breath sounds.   Abdominal:      General: Abdomen is flat. Bowel sounds are normal.      Palpations: Abdomen is soft.      Tenderness: There is abdominal tenderness in the epigastric area. Negative signs include Arriaza's sign and McBurney's sign.   Musculoskeletal:      Cervical back: Normal range of motion and neck supple.   Neurological:      Mental Status: She is alert and oriented to person, place, and time.              Mervin Holguin PA-C

## 2022-11-18 ENCOUNTER — OFFICE VISIT (OUTPATIENT)
Dept: FAMILY MEDICINE | Facility: CLINIC | Age: 27
End: 2022-11-18
Payer: COMMERCIAL

## 2022-11-18 VITALS
HEIGHT: 67 IN | BODY MASS INDEX: 19.62 KG/M2 | TEMPERATURE: 97.7 F | DIASTOLIC BLOOD PRESSURE: 85 MMHG | WEIGHT: 125 LBS | SYSTOLIC BLOOD PRESSURE: 126 MMHG | HEART RATE: 82 BPM | OXYGEN SATURATION: 100 %

## 2022-11-18 DIAGNOSIS — K21.9 GASTROESOPHAGEAL REFLUX DISEASE WITHOUT ESOPHAGITIS: ICD-10-CM

## 2022-11-18 DIAGNOSIS — R10.31 RLQ ABDOMINAL PAIN: Primary | ICD-10-CM

## 2022-11-18 DIAGNOSIS — R19.7 DIARRHEA, UNSPECIFIED TYPE: ICD-10-CM

## 2022-11-18 DIAGNOSIS — Z78.9 MODERATE ALCOHOL CONSUMPTION: ICD-10-CM

## 2022-11-18 DIAGNOSIS — D3A.020 BENIGN CARCINOID TUMOR OF APPENDIX (H): ICD-10-CM

## 2022-11-18 DIAGNOSIS — T14.8XXA BRUISING: ICD-10-CM

## 2022-11-18 LAB
ALBUMIN SERPL BCG-MCNC: 4.8 G/DL (ref 3.5–5.2)
ALP SERPL-CCNC: 56 U/L (ref 35–104)
ALT SERPL W P-5'-P-CCNC: 19 U/L (ref 10–35)
AST SERPL W P-5'-P-CCNC: 22 U/L (ref 10–35)
BASO+EOS+MONOS # BLD AUTO: 0.5 10E3/UL (ref 0–2.2)
BASO+EOS+MONOS NFR BLD AUTO: 6 %
BILIRUB DIRECT SERPL-MCNC: 0.32 MG/DL (ref 0–0.3)
BILIRUB SERPL-MCNC: 1.2 MG/DL
ERYTHROCYTE [DISTWIDTH] IN BLOOD BY AUTOMATED COUNT: 11.8 % (ref 10–15)
GGT SERPL-CCNC: 37 U/L (ref 5–36)
HCT VFR BLD AUTO: 43.3 % (ref 35–47)
HGB BLD-MCNC: 14.4 G/DL (ref 11.7–15.7)
INR PPP: 0.98 (ref 0.85–1.15)
LYMPHOCYTES # BLD AUTO: 1 10E3/UL (ref 0.8–5.3)
LYMPHOCYTES NFR BLD AUTO: 12 %
MCH RBC QN AUTO: 32.3 PG (ref 26.5–33)
MCHC RBC AUTO-ENTMCNC: 33.3 G/DL (ref 31.5–36.5)
MCV RBC AUTO: 97 FL (ref 78–100)
NEUTROPHILS # BLD AUTO: 6.9 10E3/UL (ref 1.6–8.3)
NEUTROPHILS NFR BLD AUTO: 82 %
PLATELET # BLD AUTO: 185 10E3/UL (ref 150–450)
PROT SERPL-MCNC: 7 G/DL (ref 6.4–8.3)
RBC # BLD AUTO: 4.46 10E6/UL (ref 3.8–5.2)
WBC # BLD AUTO: 8.4 10E3/UL (ref 4–11)

## 2022-11-18 PROCEDURE — 85610 PROTHROMBIN TIME: CPT | Performed by: INTERNAL MEDICINE

## 2022-11-18 PROCEDURE — 82977 ASSAY OF GGT: CPT | Performed by: INTERNAL MEDICINE

## 2022-11-18 PROCEDURE — 80076 HEPATIC FUNCTION PANEL: CPT | Performed by: INTERNAL MEDICINE

## 2022-11-18 NOTE — PROGRESS NOTES
"Tanya Pearson is a 27 year old female with hx of GERD, recurrent ER visits for abdominal symptoms, moderate alcohol consumption, previous hx of carcinoid tumor (appendicitis). She is here for the following issues.. Her mom is attending the visit.    Bruising on Legs  Tanya reports a history of bruising on her legs that  \"comes and goes\" ongoing for the past few months. A couple of days ago, Tanya noticed that this bruising had returned. It is still present today. She notes that she woke up one morning tasting blood and spat bloody sputum into her sink. She drinks alcohol socially on the weekends with her friends. Denies epistaxis or bleeding gums. No blood in urine or stool. Menses are light.     Gastroesophageal reflux disease without esophagitis  Tanya takes omeprazole 40 mg daily for treatment of GERD. This was prescribed at her last visit 10/31/22 when she described persistent heartburn. Was taking her mother's medication. Denied dysphagia. Today, reports she is unsure if medication is helping.     Vomiting and diarrhea/RLQ pain  Tanya had an ED  visit 9/6/22 for RLQ pain with negative workup. She recently visited the clinic at the [facility] for evaluation of vomiting and diarrhea. Checked for influenza (neg). Reports dark diarrhea (not black) and lower quadrant pain.  Has several BM per day.  She notes decreased appetite and has lost about 10 lbs since 9/2022. Denies dysphagia.  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 No wheezing. Her mother is concerned that Tanya's abdominal pain could be related to this or otherwise a problem with her \"female parts.\" Tanya had a CT Abdomen and Pelvis in 9/2022 which was unremarkable.    Menstrual Cycle " "Irregularity  Tanya takes a daily progesterone OCP. She  reports a period of time where she was bleeding vaginally daily prior to August. Since August, she has not had her menses. Denies seeing any pink tinge on her toilet paper since August. She has a female partner. She is currently taking norethindrone 0.35 mg daily for dysmenorrhea with migraines.     Patient Active Problem List   Diagnosis     Migraine with aura and without status migrainosus, not intractable     Mild intermittent asthma without complication     Dysmenorrhea     Generalized anxiety disorder     Raynaud's disease     Adjustment disorder with mixed anxiety and depressed mood     Carcinoid tumor     Moderate alcohol consumption     Gastroesophageal reflux disease without esophagitis       Current Outpatient Medications   Medication Sig Dispense Refill     norethindrone (MICRONOR) 0.35 MG tablet Take 1 tablet (0.35 mg) by mouth daily 90 tablet 3     omeprazole (PRILOSEC) 40 MG DR capsule Take 1 capsule (40 mg) by mouth daily 90 capsule 1     ondansetron (ZOFRAN ODT) 4 MG ODT tab Take 1 tablet (4 mg) by mouth every 8 hours as needed for nausea 15 tablet 0       Allergies   Allergen Reactions     Amoxicillin Rash        EXAM  /85   Pulse 82   Temp 97.7  F (36.5  C)   Ht 1.71 m (5' 7.32\")   Wt 56.7 kg (125 lb)   LMP 08/29/2022 (Approximate)   SpO2 100%   Breastfeeding No   BMI 19.39 kg/m    Gen: Alert, pleasant, NAD  Cor: S1S2 no murmur  Lungs CTA  Abdomen: Active BS, no distension, soft. Point tenderness with palpation at the RLQ and LLQ.  Skin: Thumbprint sized ecchymoses, 1 on left anterior shin, 2 on left posterior thigh, 3-4 on right posterior thigh. No bruising on arms, trunk or back.       Assessment:  (R10.31) RLQ abdominal pain  (primary encounter diagnosis)  (R19.7) Diarrhea, unspecified type  Comment: 1-2 month history of persistent lower quadrant pain, loose stools some intermittent vomiting. Weight loss, some flushing. " Previous hx of carcinoid tumor of appendix, s/p resection.   Plan: Recommend discussion with previous oncologist Dr. Reis. Tumor thought to have low risk of metastases however current abdominal complaints may warrant further investigation.     (K21.9) Gastroesophageal reflux disease without esophagitis   Comment: persistent symptoms, currently on omeprazole 40mg daily, suspect gastritis, evolving ulcer  Plan: Adult GI  Referral - Procedure Only        Referred to GI for upper endoscopy. Continue omeprazole 40mg daily.  Recommend stopping alcohol consumption.     (Z78.9) Moderate alcohol consumption  Comment: currently drinking alcohol on weekends, moderate consumption  Plan: INR, GGT        Check INR and GGT to check intrinsic liver function, recommend cutting back/stopping as poor platelet function may result and contribute to bruising, bleeding    (T14.8XXA) Bruising  Comment: light bruising on legs, none on arms, trunk, one report of likely epistaxis  Plan: INR, CBC with platelets differential, GGT,         Hepatic panel        Check platelet count, hemoglobin, liver function.     44 minutes spend on the date of this encounter doing chart review, history and exam, documentation and further activities as noted above.       Dara Pickett MD  Internal Medicine/Pediatrics      I, Erica Cunningham, am serving as a scribe to document services personally performed by Dr. Dara Pickett, based on data collection and the provider's statements to me. Dr. Pickett has reviewed, edited, and approved the above note.

## 2022-11-23 ENCOUNTER — TELEPHONE (OUTPATIENT)
Dept: GASTROENTEROLOGY | Facility: CLINIC | Age: 27
End: 2022-11-23

## 2022-11-23 NOTE — TELEPHONE ENCOUNTER
Screening Questions  BLUE  KIND OF PREP RED  LOCATION [review exclusion criteria] GREEN  SEDATION TYPE        Y Are you active on mychart?       PINGEL Ordering/Referring Provider?        MEDICA What type of coverage do you have?      N Have you had a positive covid test in the last 90 days?        Date:    1. 19.6  BMI  [BMI 40+ - review exclusion criteria]  2. Y  Are you able to give consent for your medical care? [RN REVIEW?]        3. N  Are you taking any prescription pain medications on a routine schedule?        3a.  EXTENDED PREP What kind of prescription?   4. N Do you have any chemical dependencies such as alcohol, street drugs, or methadone?    5. N Do you have any history of post-traumatic stress syndrome, severe anxiety or history of psychosis?      **If yes 2- 5 , please schedule with MAC sedation.**    BMI OVER 40 NEED PAC EVALUATION FOR [facility]          IF YES TO ANY 6 - 10 - HOSPITAL SETTING ONLY.     6.   N Do you need assistance transferring?     7.   N Have you had a heart or lung transplant?    8.   N Are you currently on dialysis?   9.   N Do you use daily home oxygen?   10. N Do you take nitroglycerin?   10a.  If yes, how often?     11. [FEMALES]  N Are you currently pregnant?    11a.  If yes, how many weeks? [ Greater than 12 weeks, OR NEEDED]    12. N Do you have Pulmonary Hypertension? *NEED PAC APPT AT [facility]*     13. N [review exclusion criteria]  Do you have any implantable devices in your body (pacemaker, defib, LVAD)?    14. N In the past 6 months, have you had any heart related issues including cardiomyopathy or heart attack?     14a.  If yes, did it require cardiac stenting if so when?     15. N Have you had a stroke or Transient ischemic attack (TIA - aka  mini stroke ) within 6 months?      16. N Do you have mod to severe Obstructive Sleep Apnea?  [Hospital only - Ok at [place]]    17. N Do you have SEVERE AND UNCONTROLLED asthma?     18. N Are you currently taking any blood  "thinners?     18a. If yes, inform patient to \"follow up w/ ordering provider for bridging instructions.\"    19. N Do you take the medication Phentermine?    19a. If yes, \"Hold for 7 days before procedure.  Please consult your prescribing provider if you have questions about holding this medication.\"     20. N  Do you have chronic kidney disease?      21. N  Do you have a diagnosis of diabetes?     22. NA  On a regular basis do you go 3-5 days between bowel movements?     23.  Preferred LOCAL Pharmacy for Pre Prescription    [ LIST ONLY ONE PHARMACY] [id number] - [place], [place] - [address] AT [facility] [address]        - CLOSING REMINDERS -    Informed patient they will need an adult    Cannot take any type of public or medical transportation alone    Conscious Sedation- Needs  for 6 hours after the procedure       MAC/General-Needs  for 24 hours after procedure    Pre-Procedure Covid test to be completed [[facility] PCR Testing Required]    Confirmed Nurse will call to complete pre-assessment       - SCHEDULING DETAILS -     MANE  Surgeon    11/28  Date of Procedure  Upper Endoscopy [EGD]  Type of Procedure Scheduled   [facility] Location    CS Sedation Type     N PAC / Pre-op Required         Additional comments:        "

## 2022-11-25 ENCOUNTER — TELEPHONE (OUTPATIENT)
Dept: GASTROENTEROLOGY | Facility: CLINIC | Age: 27
End: 2022-11-25

## 2022-11-25 NOTE — TELEPHONE ENCOUNTER
Attempted to contact patient regarding upcoming EGD procedure on 11/28/22 for pre assessment questions. No answer.     Mailbox if full. Will send [employer] message to return call to [phone] #[phone]    Covid test scheduled? no Discuss at home rapid antigen COVID test 1-2 days prior to procedure.    Arrival time: 1200    Facility location: [facility]     Sedation type: CS     Indication for procedure: gerd     Anticoagulants: no.     Diabetic? no     Vicky Chaves RN

## 2022-11-28 ENCOUNTER — HOSPITAL ENCOUNTER (OUTPATIENT)
Facility: AMBULATORY SURGERY CENTER | Age: 27
Discharge: HOME | End: 2022-11-28
Attending: INTERNAL MEDICINE | Admitting: INTERNAL MEDICINE
Payer: COMMERCIAL

## 2022-11-28 VITALS
TEMPERATURE: 98 F | RESPIRATION RATE: 16 BRPM | HEART RATE: 86 BPM | DIASTOLIC BLOOD PRESSURE: 77 MMHG | SYSTOLIC BLOOD PRESSURE: 116 MMHG | OXYGEN SATURATION: 100 %

## 2022-11-28 LAB — UPPER GI ENDOSCOPY: NORMAL

## 2022-11-28 PROCEDURE — 88305 TISSUE EXAM BY PATHOLOGIST: CPT | Mod: 26 | Performed by: STUDENT IN AN ORGANIZED HEALTH CARE EDUCATION/TRAINING PROGRAM

## 2022-11-28 PROCEDURE — 43239 EGD BIOPSY SINGLE/MULTIPLE: CPT

## 2022-11-28 PROCEDURE — 88305 TISSUE EXAM BY PATHOLOGIST: CPT | Mod: TC | Performed by: INTERNAL MEDICINE

## 2022-11-28 RX ORDER — ONDANSETRON 2 MG/ML
4 INJECTION INTRAMUSCULAR; INTRAVENOUS
Status: DISCONTINUED | OUTPATIENT
Start: 2022-11-28 | End: 2022-11-29 | Stop reason: HOSPADM

## 2022-11-28 RX ORDER — LIDOCAINE 40 MG/G
CREAM TOPICAL
Status: DISCONTINUED | OUTPATIENT
Start: 2022-11-28 | End: 2022-11-29 | Stop reason: HOSPADM

## 2022-11-28 RX ORDER — FENTANYL CITRATE 50 UG/ML
INJECTION, SOLUTION INTRAMUSCULAR; INTRAVENOUS PRN
Status: DISCONTINUED | OUTPATIENT
Start: 2022-11-28 | End: 2022-11-28 | Stop reason: HOSPADM

## 2022-11-28 NOTE — DISCHARGE INSTRUCTIONS
Discharge Instructions after  Upper Endoscopy (EGD)    Activity and Diet  You were given medicine for pain. You may be dizzy or sleepy.  For 24 hours:   Do not drive or use heavy equipment.   Do not make important decisions.   Do not drink any alcohol.  ___ You may return to your regular diet.    Discomfort  You may have a sore throat for 2 to 3 days. It may help to:   Avoid hot liquids for 24 hours.   Use sore throat lozenges.   Gargle as needed with salt water up to 4 times a day. Mix 1 cup of warm water  with 1 teaspoon of salt. Do not swallow.  ___ Your esophagus was dilated (opened) or banded during the exam:   Drink only cool liquids for the rest of the day. Eat a soft diet for the next few days.   You may have a sore chest for 2 to 3 days.    You may take Tylenol (acetaminophen) for pain unless your doctor has told you not to.    Do not take aspirin or ibuprofen (Advil, Motrin) or other NSAIDS  (anti-inflammatory drugs) for ___ days.    Follow-up  ___ We took small tissue samples for study. If you do not have a follow-up visit scheduled,  call your provider s office in 2 weeks for the results.    Other instructions________________________________________________________    When to call us:  Problems are rare. Call right away if you have:   Unusual throat pain or trouble swallowing   Unusual pain in belly or chest that is not relieved by belching or passing air   Black stools (tar-like looking bowel movement)   Temperature above 100.6  F. (37.5  C).    If you vomit blood or have severe pain, go to an emergency room.    If you have questions, call:  Monday to Friday, 8 a.m. to 4:30 p.m.: Central Scheduling Department:[phone]    After hours: Hospital: [phone] (Ask for the GI fellow on call)  [facility]   Adult Discharge Orders & Instructions     For 24 hours after surgery    Get plenty of rest.  A responsible adult must stay with you for at least 24 hours after you leave the hospital.    Do not drive or use heavy equipment.  If you have weakness or tingling, don't drive or use heavy equipment until this feeling goes away.  Do not drink alcohol.  Avoid strenuous or risky activities.  Ask for help when climbing stairs.   You may feel lightheaded.  IF so, sit for a few minutes before standing.  Have someone help you get up.   If you have nausea (feel sick to your stomach): Drink only clear liquids such as apple juice, ginger ale, broth or 7-Up.  Rest may also help.  Be sure to drink enough fluids.  Move to a regular diet as you feel able.  You may have a slight fever. Call the doctor if your fever is over 100 F (37.7 C) (taken under the tongue) or lasts longer than 24 hours.  You may have a dry mouth, a sore throat, muscle aches or trouble sleeping.  These should go away after 24 hours.  Do not make important or legal decisions.   Call your doctor for any of the followin.  Signs of infection (fever, growing tenderness at the surgery site, a large amount of drainage or bleeding, severe pain, foul-smelling drainage, redness, swelling).    2. It has been over 8 to 10 hours since surgery and you are still not able to urinate (pass water).    3.  Headache for over 24 hours.    4.  Numbness, tingling or weakness the day after surgery (if you had spinal anesthesia).  To contact a doctor, call ________________________________________

## 2022-11-28 NOTE — H&P
Tanya Pearson  [id number]  female  27 year old      Reason for procedure/surgery: reflux while on PPI therapy    Patient Active Problem List   Diagnosis     Migraine with aura and without status migrainosus, not intractable     Mild intermittent asthma without complication     Dysmenorrhea     Generalized anxiety disorder     Raynaud's disease     Adjustment disorder with mixed anxiety and depressed mood     Carcinoid tumor     Moderate alcohol consumption     Gastroesophageal reflux disease without esophagitis       Past Surgical History:    Past Surgical History:   Procedure Laterality Date     LAPAROSCOPIC APPENDECTOMY N/A 4/19/2021    Procedure: APPENDECTOMY, LAPAROSCOPIC;  Surgeon: Doc Vazquez DO;  Location: [facility] OR     NO HISTORY OF SURGERY         Past Medical History: History reviewed. No pertinent past medical history.    Social History:   Social History     Tobacco Use     Smoking status: Every Day     Types: Vaping Device     Smokeless tobacco: Never   Substance Use Topics     Alcohol use: Yes     Comment: occasional       Family History:   Family History   Problem Relation Age of Onset     Cancer Maternal Grandmother         skin cancer     Breast Cancer Paternal Grandmother 76       Allergies:   Allergies   Allergen Reactions     Amoxicillin Rash       Active Medications:   Current Outpatient Medications   Medication Sig Dispense Refill     norethindrone (MICRONOR) 0.35 MG tablet Take 1 tablet (0.35 mg) by mouth daily 90 tablet 3     omeprazole (PRILOSEC) 40 MG DR capsule Take 1 capsule (40 mg) by mouth daily 90 capsule 1     ondansetron (ZOFRAN ODT) 4 MG ODT tab Take 1 tablet (4 mg) by mouth every 8 hours as needed for nausea 15 tablet 0       Systemic Review:   CONSTITUTIONAL: NEGATIVE for fever, chills, change in weight  ENT/MOUTH: NEGATIVE for ear, mouth and throat problems  RESP: NEGATIVE for significant cough or SOB  CV: NEGATIVE for chest pain, palpitations or peripheral  edema    Physical Examination:   Vital Signs: /84 (Cuff Size: Adult Regular)   Pulse 81   Temp 98  F (36.7  C)   Resp 16   SpO2 100%   GENERAL: healthy, alert and no distress  NECK: no adenopathy, no asymmetry, masses, or scars  RESP: lungs clear to auscultation - no rales, rhonchi or wheezes  CV: regular rate and rhythm, normal S1 S2, no S3 or S4, no murmur, click or rub, no peripheral edema and peripheral pulses strong  ABDOMEN: soft, nontender, no hepatosplenomegaly, no masses and bowel sounds normal  MS: no gross musculoskeletal defects noted, no edema    Plan: Appropriate to proceed as scheduled.      Lexie Peck MD  11/28/2022    PCP:  Dara Pickett

## 2022-11-30 LAB
PATH REPORT.COMMENTS IMP SPEC: NORMAL
PATH REPORT.COMMENTS IMP SPEC: NORMAL
PATH REPORT.FINAL DX SPEC: NORMAL
PATH REPORT.GROSS SPEC: NORMAL
PATH REPORT.MICROSCOPIC SPEC OTHER STN: NORMAL
PATH REPORT.RELEVANT HX SPEC: NORMAL
PHOTO IMAGE: NORMAL

## 2022-12-05 ENCOUNTER — ONCOLOGY VISIT (OUTPATIENT)
Dept: ONCOLOGY | Facility: CLINIC | Age: 27
End: 2022-12-05
Attending: INTERNAL MEDICINE
Payer: COMMERCIAL

## 2022-12-05 VITALS
TEMPERATURE: 98.4 F | DIASTOLIC BLOOD PRESSURE: 83 MMHG | WEIGHT: 126.3 LBS | SYSTOLIC BLOOD PRESSURE: 131 MMHG | HEART RATE: 91 BPM | OXYGEN SATURATION: 97 % | RESPIRATION RATE: 14 BRPM | BODY MASS INDEX: 19.59 KG/M2

## 2022-12-05 DIAGNOSIS — R19.7 DIARRHEA OF PRESUMED INFECTIOUS ORIGIN: Primary | ICD-10-CM

## 2022-12-05 DIAGNOSIS — D3A.020 CARCINOID TUMOR OF APPENDIX, UNSPECIFIED WHETHER MALIGNANT (H): ICD-10-CM

## 2022-12-05 PROCEDURE — 99214 OFFICE O/P EST MOD 30 MIN: CPT | Performed by: INTERNAL MEDICINE

## 2022-12-05 PROCEDURE — G0463 HOSPITAL OUTPT CLINIC VISIT: HCPCS

## 2022-12-05 ASSESSMENT — PAIN SCALES - GENERAL: PAINLEVEL: NO PAIN (0)

## 2022-12-05 NOTE — PROGRESS NOTES
Oncology Follow-up Visit:  December 5, 2022  Diagnosis:    History Of Present Illness:  Ms. 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 She initially presented with abdominal pain and had CT scanning showing acute appendicitis.  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  She has recovered rapidly from her surgery and had a postoperative scan that was unremarkable. Upper endoscopy 11/28 showing mild chronic gastritis and evidence of reflux, negative for metaplasia or dysplasia.       She really has no significant prior medical history.     Her family history is relatively unremarkable with a maternal grandmother diagnosed with breast cancer in her 80s and a paternal grandfather who was a heavy smoker with lung cancer in his 50s.  She and her parents are unaware of any endocrine tumors, endocrinopathies or problems with kidney stones in the extended family.       Review Of Systems:  Has had some diarrhea since her surgery 1.5 years ago and this was worse a few weeks ago. She has not had any diarrhea for 1 week. Endorses experiencing minor hot flashes a few times per week at night. Denies SOB, chest pain, cough, wheezing, flushing, or night sweats.    Nursing Notes:   Guerline Cerna MA  12/5/2022  3:25 PM  Signed  Oncology Rooming Note    December 5, 2022 3:25 PM   Tanya Pearson is a 27 year old female who presents for:    Chief Complaint   Patient presents with     Oncology Clinic Visit     RTN for Carcinoid Tumor     Initial Vitals: Blood Pressure 131/83   Pulse 91   Temperature 98.4  F (36.9  C) (Oral)   Respiration 14   Weight 57.3 kg (126 lb 4.8 oz)   Oxygen Saturation 97%   Body Mass Index 19.59 kg/m   Estimated body mass index is 19.59 kg/m  as calculated from the following:    Height as of 11/18/22: 1.71 m  "(5' 7.32\").    Weight as of this encounter: 57.3 kg (126 lb 4.8 oz). Body surface area is 1.65 meters squared.  No Pain (0) Comment: Data Unavailable   No LMP recorded.  Allergies reviewed: Yes  Medications reviewed: Yes    Medications: Medication refills not needed today.  Pharmacy name entered into [facility]: [id number] [address] AT [facility]    Clinical concerns: none       Guerline Cerna MA                Past medical, social, surgical, and family histories reviewed.    Allergies:  Allergies as of 12/05/2022 - Reviewed 12/05/2022   Allergen Reaction Noted     Amoxicillin Rash 11/24/2015       Current Medications:  Current Outpatient Medications   Medication Sig Dispense Refill     norethindrone (MICRONOR) 0.35 MG tablet Take 1 tablet (0.35 mg) by mouth daily 90 tablet 3     omeprazole (PRILOSEC) 40 MG DR capsule Take 1 capsule (40 mg) by mouth daily 90 capsule 1     ondansetron (ZOFRAN ODT) 4 MG ODT tab Take 1 tablet (4 mg) by mouth every 8 hours as needed for nausea 15 tablet 0        Physical Exam:  /83   Pulse 91   Temp 98.4  F (36.9  C) (Oral)   Resp 14   Wt 57.3 kg (126 lb 4.8 oz)   SpO2 97%   BMI 19.59 kg/m      GENERAL APPEARANCE: healthy, alert and no distress     HENT: Mouth without ulcers or lesions     NECK: no adenopathy, no asymmetry or masses     LYMPHATICS: No cervical, supraclavicular, axillary or inguinal lymphadenopathy     RESP: lungs clear to auscultation - no rales, rhonchi or wheezes     CARDIOVASCULAR: regular rates and rhythm, normal S1 S2, no S3 or S4 and no murmur.     ABDOMEN:  soft, nontender, no HSM or masses and bowel sounds normal     MUSCULOSKELETAL: extremities normal- no gross deformities noted, no evidence of inflammation in joints, FROM in all extremities. No edema b/l LE.     SKIN: no suspicious lesions or rashes     PSYCHIATRIC: mentation appears normal and affect normal    Laboratory/Imaging Studies  No " visits with results within 2 Week(s) from this visit.   Latest known visit with results is:   Office Visit on 11/18/2022   Component Date Value Ref Range Status     INR 11/18/2022 0.98  0.85 - 1.15 Final     GGT 11/18/2022 37 (H)  5 - 36 U/L Final     Protein Total 11/18/2022 7.0  6.4 - 8.3 g/dL Final     Albumin 11/18/2022 4.8  3.5 - 5.2 g/dL Final     Bilirubin Total 11/18/2022 1.2  <=1.2 mg/dL Final     Alkaline Phosphatase 11/18/2022 56  35 - 104 U/L Final     AST 11/18/2022 22  10 - 35 U/L Final     ALT 11/18/2022 19  10 - 35 U/L Final     Bilirubin Direct 11/18/2022 0.32 (H)  0.00 - 0.30 mg/dL Final     WBC Count 11/18/2022 8.4  4.0 - 11.0 10e3/uL Final     RBC Count 11/18/2022 4.46  3.80 - 5.20 10e6/uL Final     Hemoglobin 11/18/2022 14.4  11.7 - 15.7 g/dL Final     Hematocrit 11/18/2022 43.3  35.0 - 47.0 % Final     MCV 11/18/2022 97  78 - 100 fL Final     MCH 11/18/2022 32.3  26.5 - 33.0 pg Final     MCHC 11/18/2022 33.3  31.5 - 36.5 g/dL Final     RDW 11/18/2022 11.8  10.0 - 15.0 % Final     Platelet Count 11/18/2022 185  150 - 450 10e3/uL Final     % Neutrophils 11/18/2022 82  % Final     % Lymphocytes 11/18/2022 12  % Final     Mids % (Monos, Eos, Basos) 11/18/2022 6  % Final     Absolute Neutrophils 11/18/2022 6.9  1.6 - 8.3 10e3/uL Final     Absolute Lymphocytes 11/18/2022 1.0  0.8 - 5.3 10e3/uL Final     Mids Abs (Monos, Eos, Basos) 11/18/2022 0.5  0.0 - 2.2 10e3/uL Final      I personally reviewed  Her Ct scan done this fall  And see no evidnece of signficant mesenteric or retroperitoneal eduarda disease. Nothing is apparent in her liver, but this was a non-contrast scan so not very sensitive.    ASSESSMENT/PLAN:  #Appendiceal carcinoid tumor  #nausea   #diarrhea  - Her tumor was less than a centimeter, well differentiated and had clear proximal margins at time of surgery, I don't think the current minor flushing episodes and period of diarrhea represent carcinoid symptoms and she has no evidence of  recurrence  - this is a low risk tumor and very unlikely to recur. Even if it were to recur, the first step would likely be to watch and wait due to the slow-growing nature of well-differentiated neuroendocrine tumors.  - No need for follow up with oncology. However, if she has persistent unexplained symptoms such as diarrhea, new or worsening abdominal pain, flushing, SOB, etc. then she should make an appointment with oncology.    #mild chronic gastritis  #reflux  - continue scheduled omeprazole and prn ondansetron      I saw the patient in conjunction with the medical student, Coleman Bates, who served as a scribe for this visit. The above note has been edited by me and I have personally confirmed the history and physical exam and developed the assessment and plan. Her current diarrheal episode seems to be resolving and I would be very surprised if this was related to her prior tiny appendiceal carcinoid. I don't think she needs further imaging or work-up at this point.  I do not think we need any planned follow-up unless she has recurrent or progressive symptoms.  She will let us know if the diarrhea reoccurs to any significant degree if she has other worsening symptoms.

## 2022-12-05 NOTE — LETTER
12/5/2022         RE: Tanya Pearson  [address] [place] [address]        Dear Colleague,    Thank you for referring your patient, Tanya Pearson, to the [facility]. Please see a copy of my visit note below.    Oncology Follow-up Visit:  December 5, 2022  Diagnosis:    History Of Present Illness:  Ms. 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 She initially presented with abdominal pain and had CT scanning showing acute appendicitis.  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  She has recovered rapidly from her surgery and had a postoperative scan that was unremarkable. Upper endoscopy 11/28 showing mild chronic gastritis and evidence of reflux, negative for metaplasia or dysplasia.       She really has no significant prior medical history.     Her family history is relatively unremarkable with a maternal grandmother diagnosed with breast cancer in her 80s and a paternal grandfather who was a heavy smoker with lung cancer in his 50s.  She and her parents are unaware of any endocrine tumors, endocrinopathies or problems with kidney stones in the extended family.       Review Of Systems:  Has had some diarrhea since her surgery 1.5 years ago and this was worse a few weeks ago. She has not had any diarrhea for 1 week. Endorses experiencing minor hot flashes a few times per week at night. Denies SOB, chest pain, cough, wheezing, flushing, or night sweats.    Nursing Notes:   Guerline Cerna MA  12/5/2022  3:25 PM  Signed  Oncology Rooming Note    December 5, 2022 3:25 PM   Tanya Pearson is a 27 year old female who presents for:    Chief Complaint   Patient presents with     Oncology Clinic Visit     RTN for Carcinoid Tumor     Initial Vitals: Blood Pressure 131/83    "Pulse 91   Temperature 98.4  F (36.9  C) (Oral)   Respiration 14   Weight 57.3 kg (126 lb 4.8 oz)   Oxygen Saturation 97%   Body Mass Index 19.59 kg/m   Estimated body mass index is 19.59 kg/m  as calculated from the following:    Height as of 11/18/22: 1.71 m (5' 7.32\").    Weight as of this encounter: 57.3 kg (126 lb 4.8 oz). Body surface area is 1.65 meters squared.  No Pain (0) Comment: Data Unavailable   No LMP recorded.  Allergies reviewed: Yes  Medications reviewed: Yes    Medications: Medication refills not needed today.  Pharmacy name entered into [employer]: [facility] #[id number] [place], [place] - [address] AT [facility]    Clinical concerns: none       Guerline Cerna MA                Past medical, social, surgical, and family histories reviewed.    Allergies:  Allergies as of 12/05/2022 - Reviewed 12/05/2022   Allergen Reaction Noted     Amoxicillin Rash 11/24/2015       Current Medications:  Current Outpatient Medications   Medication Sig Dispense Refill     norethindrone (MICRONOR) 0.35 MG tablet Take 1 tablet (0.35 mg) by mouth daily 90 tablet 3     omeprazole (PRILOSEC) 40 MG DR capsule Take 1 capsule (40 mg) by mouth daily 90 capsule 1     ondansetron (ZOFRAN ODT) 4 MG ODT tab Take 1 tablet (4 mg) by mouth every 8 hours as needed for nausea 15 tablet 0        Physical Exam:  /83   Pulse 91   Temp 98.4  F (36.9  C) (Oral)   Resp 14   Wt 57.3 kg (126 lb 4.8 oz)   SpO2 97%   BMI 19.59 kg/m      GENERAL APPEARANCE: healthy, alert and no distress     HENT: Mouth without ulcers or lesions     NECK: no adenopathy, no asymmetry or masses     LYMPHATICS: No cervical, supraclavicular, axillary or inguinal lymphadenopathy     RESP: lungs clear to auscultation - no rales, rhonchi or wheezes     CARDIOVASCULAR: regular rates and rhythm, normal S1 S2, no S3 or S4 and no murmur.     ABDOMEN:  soft, nontender, no HSM or masses and bowel sounds normal     MUSCULOSKELETAL: " extremities normal- no gross deformities noted, no evidence of inflammation in joints, FROM in all extremities. No edema b/l LE.     SKIN: no suspicious lesions or rashes     PSYCHIATRIC: mentation appears normal and affect normal    Laboratory/Imaging Studies  No visits with results within 2 Week(s) from this visit.   Latest known visit with results is:   Office Visit on 11/18/2022   Component Date Value Ref Range Status     INR 11/18/2022 0.98  0.85 - 1.15 Final     GGT 11/18/2022 37 (H)  5 - 36 U/L Final     Protein Total 11/18/2022 7.0  6.4 - 8.3 g/dL Final     Albumin 11/18/2022 4.8  3.5 - 5.2 g/dL Final     Bilirubin Total 11/18/2022 1.2  <=1.2 mg/dL Final     Alkaline Phosphatase 11/18/2022 56  35 - 104 U/L Final     AST 11/18/2022 22  10 - 35 U/L Final     ALT 11/18/2022 19  10 - 35 U/L Final     Bilirubin Direct 11/18/2022 0.32 (H)  0.00 - 0.30 mg/dL Final     WBC Count 11/18/2022 8.4  4.0 - 11.0 10e3/uL Final     RBC Count 11/18/2022 4.46  3.80 - 5.20 10e6/uL Final     Hemoglobin 11/18/2022 14.4  11.7 - 15.7 g/dL Final     Hematocrit 11/18/2022 43.3  35.0 - 47.0 % Final     MCV 11/18/2022 97  78 - 100 fL Final     MCH 11/18/2022 32.3  26.5 - 33.0 pg Final     MCHC 11/18/2022 33.3  31.5 - 36.5 g/dL Final     RDW 11/18/2022 11.8  10.0 - 15.0 % Final     Platelet Count 11/18/2022 185  150 - 450 10e3/uL Final     % Neutrophils 11/18/2022 82  % Final     % Lymphocytes 11/18/2022 12  % Final     Mids % (Monos, Eos, Basos) 11/18/2022 6  % Final     Absolute Neutrophils 11/18/2022 6.9  1.6 - 8.3 10e3/uL Final     Absolute Lymphocytes 11/18/2022 1.0  0.8 - 5.3 10e3/uL Final     Mids Abs (Monos, Eos, Basos) 11/18/2022 0.5  0.0 - 2.2 10e3/uL Final      I personally reviewed  Her Ct scan done this fall  And see no evidnece of signficant mesenteric or retroperitoneal eduarda disease. Nothing is apparent in her liver, but this was a non-contrast scan so not very sensitive.    ASSESSMENT/PLAN:  #Appendiceal carcinoid  tumor  #nausea   #diarrhea  - Her tumor was less than a centimeter, well differentiated and had clear proximal margins at time of surgery, I don't think the current minor flushing episodes and period of diarrhea represent carcinoid symptoms and she has no evidence of recurrence  - this is a low risk tumor and very unlikely to recur. Even if it were to recur, the first step would likely be to watch and wait due to the slow-growing nature of well-differentiated neuroendocrine tumors.  - No need for follow up with oncology. However, if she has persistent unexplained symptoms such as diarrhea, new or worsening abdominal pain, flushing, SOB, etc. then she should make an appointment with oncology.    #mild chronic gastritis  #reflux  - continue scheduled omeprazole and prn ondansetron      I saw the patient in conjunction with the medical student, Coleman Bates, who served as a scribe for this visit. The above note has been edited by me and I have personally confirmed the history and physical exam and developed the assessment and plan. Her current diarrheal episode seems to be resolving and I would be very surprised if this was related to her prior tiny appendiceal carcinoid. I don't think she needs further imaging or work-up at this point.  I do not think we need any planned follow-up unless she has recurrent or progressive symptoms.  She will let us know if the diarrhea reoccurs to any significant degree if she has other worsening symptoms.        Again, thank you for allowing me to participate in the care of your patient.        Sincerely,        Lane Reis MD

## 2022-12-05 NOTE — NURSING NOTE
"Oncology Rooming Note    December 5, 2022 3:25 PM   Tanya Pearson is a 27 year old female who presents for:    Chief Complaint   Patient presents with     Oncology Clinic Visit     RTN for Carcinoid Tumor     Initial Vitals: Blood Pressure 131/83   Pulse 91   Temperature 98.4  F (36.9  C) (Oral)   Respiration 14   Weight 57.3 kg (126 lb 4.8 oz)   Oxygen Saturation 97%   Body Mass Index 19.59 kg/m   Estimated body mass index is 19.59 kg/m  as calculated from the following:    Height as of 11/18/22: 1.71 m (5' 7.32\").    Weight as of this encounter: 57.3 kg (126 lb 4.8 oz). Body surface area is 1.65 meters squared.  No Pain (0) Comment: Data Unavailable   No LMP recorded.  Allergies reviewed: Yes  Medications reviewed: Yes    Medications: Medication refills not needed today.  Pharmacy name entered into [employer]: [id number] [place], [place] - [address] AT [facility]    Clinical concerns: none       Guerline Cerna MA            "

## 2022-12-06 PROBLEM — M54.50 LOWER BACK PAIN: Status: ACTIVE | Noted: 2022-12-06

## 2022-12-06 PROBLEM — M54.2 NECK PAIN: Status: ACTIVE | Noted: 2022-12-06

## 2022-12-06 PROBLEM — J45.909 ASTHMA: Status: ACTIVE | Noted: 2022-12-06

## 2022-12-06 PROBLEM — J01.00 ACUTE MAXILLARY SINUSITIS: Status: ACTIVE | Noted: 2022-12-06

## 2022-12-07 ENCOUNTER — OFFICE VISIT (OUTPATIENT)
Dept: FAMILY MEDICINE | Facility: CLINIC | Age: 27
End: 2022-12-07
Payer: COMMERCIAL

## 2022-12-07 VITALS
SYSTOLIC BLOOD PRESSURE: 123 MMHG | HEIGHT: 67 IN | WEIGHT: 126.04 LBS | TEMPERATURE: 97.7 F | HEART RATE: 66 BPM | DIASTOLIC BLOOD PRESSURE: 74 MMHG | BODY MASS INDEX: 19.78 KG/M2 | OXYGEN SATURATION: 99 %

## 2022-12-07 DIAGNOSIS — K21.00 GASTROESOPHAGEAL REFLUX DISEASE WITH ESOPHAGITIS WITHOUT HEMORRHAGE: ICD-10-CM

## 2022-12-07 DIAGNOSIS — Z02.89 ENCOUNTER FOR COMPLETION OF FORM WITH PATIENT: Primary | ICD-10-CM

## 2022-12-07 DIAGNOSIS — K44.9 HIATAL HERNIA: ICD-10-CM

## 2022-12-07 DIAGNOSIS — R10.31 RLQ ABDOMINAL PAIN: ICD-10-CM

## 2022-12-07 DIAGNOSIS — R19.5 LOOSE STOOLS: ICD-10-CM

## 2022-12-07 NOTE — NURSING NOTE
"27 year old  Chief Complaint   Patient presents with     Work note     Pt needs some documents so she can take a medical leave from work.       Blood pressure 123/74, pulse 66, temperature 97.7  F (36.5  C), height 1.71 m (5' 7.32\"), weight 57.2 kg (126 lb 0.6 oz), last menstrual period 11/23/2022, SpO2 99 %, not currently breastfeeding. Body mass index is 19.55 kg/m .  Patient Active Problem List   Diagnosis     Migraine with aura and without status migrainosus, not intractable     Mild intermittent asthma without complication     Dysmenorrhea     Generalized anxiety disorder     Raynaud's disease     Adjustment disorder with mixed anxiety and depressed mood     Carcinoid tumor     Moderate alcohol consumption     Gastroesophageal reflux disease without esophagitis     Acute maxillary sinusitis     Asthma     Lower back pain     Neck pain       Wt Readings from Last 2 Encounters:   12/07/22 57.2 kg (126 lb 0.6 oz)   12/05/22 57.3 kg (126 lb 4.8 oz)     BP Readings from Last 3 Encounters:   12/07/22 123/74   12/05/22 131/83   11/28/22 116/77         Current Outpatient Medications   Medication     norethindrone (MICRONOR) 0.35 MG tablet     omeprazole (PRILOSEC) 40 MG DR capsule     ondansetron (ZOFRAN ODT) 4 MG ODT tab     No current facility-administered medications for this visit.       Social History     Tobacco Use     Smoking status: Every Day     Types: Vaping Device     Smokeless tobacco: Never   Substance Use Topics     Alcohol use: Yes     Comment: occasional     Drug use: No       Health Maintenance Due   Topic Date Due     NICOTINE/TOBACCO CESSATION COUNSELING Q 1 YR  Never done     YEARLY PREVENTIVE VISIT  Never done     ADVANCE CARE PLANNING  Never done     Pneumococcal Vaccine: Pediatrics (0 to 5 Years) and At-Risk Patients (6 to 64 Years) (1 - PCV) Never done     HIV SCREENING  Never done     HEPATITIS C SCREENING  Never done     DTAP/TDAP/TD IMMUNIZATION (7 - Td or Tdap) 07/30/2019     COVID-19 " Vaccine (4 - Booster for Moderna series) 02/11/2022     INFLUENZA VACCINE (1) 09/01/2022       Lab Results   Component Value Date    PAP NIL 05/31/2019 December 7, 2022 2:50 PM

## 2022-12-07 NOTE — PATIENT INSTRUCTIONS
Follow up appt  Wed 12/28  arrive 4:05 pm  Dr. Statongel    Metamucil powder every day    1% hydrocortisone to rash on inner arm

## 2022-12-07 NOTE — PROGRESS NOTES
Tanya Pearson is a 27 year old female here for the following issues:    [facility] paperwork  Tanya presents to request [facility] paperwork be completed on her behalf. She has had persistent RLQ pain. Is s/p appendectomy 4/2021 which had incidental finding of carcinoid tumor. Surgery was deemed definitive treatment. However she has persistent RLQ pain of unclear etiology.  She had CT scan of her abdomen/pelvis 9/2022 which was unremarkable.  She recently met with an oncologist as she was concerned carcinoid tumor may have recurred but was reassured this was not the source of her pain.     She was recently seen at at [facility] in clinic 11/15 for nausea, vomting, diarrhea. She was diagnosed with gastroenteritis and conservative care was discussed.     She has persistent symptoms of acid reflux and has been taking omeprazole 40mg each morning. Recently had upper endoscopy, 11/28/22 showing medium sized hiatal hernia and mucosal breaks in the esophagus. Stomach and duodenum were normal.     She has had persistent diarrhea. Yesterday went twice, and has gone 3x today. No associated fevers, no blood, no recent travel. No ill contacts.     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 She typically works full time, sometimes she is scheduled for weekend hours.     Patient Active Problem List   Diagnosis     Migraine with aura and without status migrainosus, not intractable     Mild intermittent asthma without complication     Dysmenorrhea     Generalized anxiety disorder     Raynaud's disease     Adjustment disorder with mixed anxiety and depressed mood     Carcinoid tumor     Moderate alcohol consumption     Gastroesophageal reflux disease without esophagitis     Acute maxillary sinusitis     Asthma     Lower back pain     Neck pain       Current Outpatient Medications   Medication Sig Dispense Refill     norethindrone (MICRONOR) 0.35 MG tablet  "Take 1 tablet (0.35 mg) by mouth daily 90 tablet 3     omeprazole (PRILOSEC) 40 MG DR capsule Take 1 capsule (40 mg) by mouth daily 90 capsule 1     ondansetron (ZOFRAN ODT) 4 MG ODT tab Take 1 tablet (4 mg) by mouth every 8 hours as needed for nausea 15 tablet 0     Allergies   Allergen Reactions     Amoxicillin Rash      EXAM  /74   Pulse 66   Temp 97.7  F (36.5  C)   Ht 1.71 m (5' 7.32\")   Wt 57.2 kg (126 lb 0.6 oz)   LMP 11/23/2022 (Approximate)   SpO2 99%   BMI 19.55 kg/m    Gen: Alert, pleasant, NAD  COR: S1,S2, no murmur  Lungs: CTA bilaterally, no rhonchi, wheezes or rales  Abdomen: Soft, +point tenderness with palpation over midepigastrium and RLQ. Normal bowel sounds, no HSM or mass    Assessment:  (Z02.89) Encounter for completion of form with patient  (primary encounter diagnosis)  (R10.31) RLQ abdominal pain  (R19.5) Loose stools  (K44.9) Hiatal hernia  (K21.00) Gastroesophageal reflux disease with esophagitis without hemorrhage  Comment: debilitating GI symptoms, abnormal recent EGD, symptoms making it difficult for Tanya to work, unpredictable symptoms, abdominal pain, loose stools, acid reflux  Plan: Adult GI  Referral - Consult Only        I completed [facility] paperwork today. Initiate time off work 11/15/22 - January 30, 2023.  Referral to GI team is entered. Continue taking omeprazole 40mg daily, ondansetron. Recommend addition of metamucil powder daily.    Copy of completed [facility] paperwork given to Tanya to submit to her employer.    30 minutes spend on the date of this encounter doing chart review, history and exam, documentation and further activities as noted above.       RTC 3 wks    Dara Pickett MD  Internal Medicine/Pediatrics    "

## 2022-12-28 ENCOUNTER — OFFICE VISIT (OUTPATIENT)
Dept: FAMILY MEDICINE | Facility: CLINIC | Age: 27
End: 2022-12-28
Payer: COMMERCIAL

## 2022-12-28 VITALS
TEMPERATURE: 97.7 F | OXYGEN SATURATION: 96 % | SYSTOLIC BLOOD PRESSURE: 130 MMHG | HEART RATE: 85 BPM | RESPIRATION RATE: 15 BRPM | WEIGHT: 128 LBS | DIASTOLIC BLOOD PRESSURE: 92 MMHG | BODY MASS INDEX: 19.86 KG/M2

## 2022-12-28 DIAGNOSIS — K21.9 GASTROESOPHAGEAL REFLUX DISEASE WITHOUT ESOPHAGITIS: ICD-10-CM

## 2022-12-28 DIAGNOSIS — R19.7 VOMITING AND DIARRHEA: Primary | ICD-10-CM

## 2022-12-28 DIAGNOSIS — R05.1 ACUTE COUGH: ICD-10-CM

## 2022-12-28 DIAGNOSIS — R11.10 VOMITING AND DIARRHEA: Primary | ICD-10-CM

## 2022-12-28 RX ORDER — ONDANSETRON 4 MG/1
4 TABLET, ORALLY DISINTEGRATING ORAL EVERY 8 HOURS PRN
Qty: 30 TABLET | Refills: 1 | Status: SHIPPED | OUTPATIENT
Start: 2022-12-28 | End: 2023-01-16

## 2022-12-28 RX ORDER — ALBUTEROL SULFATE 90 UG/1
2 AEROSOL, METERED RESPIRATORY (INHALATION) EVERY 4 HOURS PRN
Qty: 18 G | Refills: 1 | Status: SHIPPED | OUTPATIENT
Start: 2022-12-28 | End: 2024-08-08

## 2022-12-28 NOTE — PROGRESS NOTES
Tanya Pearson is a 27 year old woman with hx of migraine, asthma, dysmenorrhea, anxiety, moderate alcohol consumption, GERD, low back pain, hx of carcinoma tumor. She is accompanied by her mother, here for the following issues:    Abdominal Pain   Tanya presented to the clinic on 12/7/2022 to request [facility] paperwork be completed on her behalf. She had had persistent RLQ pain. Is s/p appendectomy 4/2021 which had incidental finding of carcinoid tumor. Surgery was deemed definitive treatment. However she had persistent RLQ pain of unclear etiology.  She had CT scan of her abdomen/pelvis 9/2022 which was unremarkable.  She met with an oncologist as she was concerned carcinoid tumor may have recurred but was reassured this was not the source of her pain.      She was seen at walk in clinic 11/15/22 for nausea, vomting, diarrhea. She was diagnosed with gastroenteritis and conservative care was discussed.      She had persistent symptoms of acid reflux and had been taking omeprazole 40mg each morning. Had upper endoscopy, 11/28/22 showing medium sized hiatal hernia and mucosal breaks in the esophagus. Stomach and duodenum were normal.      She reports persistent diarrhea occurring 2-3 times a day.  No associated fevers, no blood, no recent travel. No ill contacts.      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 She typically works full time, sometimes she is scheduled for weekend hours.     I completed [facility] paperwork on 12/7, initiating time off work 11/15/2022 - 1/30/2023 due to debilitating GI symptoms, abnormal EGD, symptoms making it difficult for Tanya to work, unpredictable symptoms, abdominal pain, loose stools, acid reflux. Referred to GI team for consultation and recommended continuing omeprazole 40 mg daily, ondansetron, recommended addition of metamucil powder daily.     Today, Tanya reports that she sent the [facility]  "paperwork to her employer and HR.  They have not requested any further information and have not pressured her to return. She missed her initial GI appointment and when she called to reschedule, she was told that the earliest available appointment at the [place] location would be in 12/2023. She asked about the [place] location and was told the earliest available appointment there would be in 11/2023. She is currently on a wait list for cancellations at the [place] location. She is willing to drive to [place] for a GI appointment if needed.    Diarrhea occurs twice daily. It is mushy, watery. On one occasion, she awoke with stomach cramping and diarrhea. She returned to bed and woke up with another bout of diarrhea several hours later. She reports persistent fatigue, \" Ijust want to lay in bed.\"  She reports right sided abdominal pain and cramping. She has tried metamucil but has not been consistent with taking this daily. Denies joint pain or rashes. No fever.    GERD  Acid reflux has improved. She is taking omeprazole 40 mg daily. She does not have much of an appetite, but tries to eat. She has not vomited since her last visit with me, but has been experiencing nausea. She takes ondansetron several times a day. She has now run out of her ondansetron.     Ismael Montgomeryie reports that she has been coughing recently and is requesting a prescription for an albuterol inhaler. She notes that it was difficult to sleep last night due to her coughs. She states that cold air triggers her cough. Of note, she was sick for about 4 weeks over a month ago.       Patient Active Problem List   Diagnosis     Migraine with aura and without status migrainosus, not intractable     Mild intermittent asthma without complication     Dysmenorrhea     Generalized anxiety disorder     Raynaud's disease     Adjustment disorder with mixed anxiety and depressed mood     Carcinoid tumor     Moderate alcohol consumption     " Gastroesophageal reflux disease without esophagitis     Acute maxillary sinusitis     Asthma     Lower back pain     Neck pain       Current Outpatient Medications   Medication Sig Dispense Refill     norethindrone (MICRONOR) 0.35 MG tablet Take 1 tablet (0.35 mg) by mouth daily 90 tablet 3     omeprazole (PRILOSEC) 40 MG DR capsule Take 1 capsule (40 mg) by mouth daily 90 capsule 1     ondansetron (ZOFRAN ODT) 4 MG ODT tab Take 1 tablet (4 mg) by mouth every 8 hours as needed for nausea 30 tablet 1       Allergies   Allergen Reactions     Amoxicillin Rash        EXAM  BP (!) 130/92 (BP Location: Right arm, Patient Position: Sitting, Cuff Size: Adult Regular)   Pulse 85   Temp 97.7  F (36.5  C) (Skin)   Resp 15   Wt 58.1 kg (128 lb)   LMP 11/23/2022 (Approximate)   SpO2 96%   BMI 19.86 kg/m    Gen: Alert, pleasant, NAD  COR: S1,S2, no murmur  Lungs: CTA bilaterally, no rhonchi, wheezes or rales  Abdomen: soft, mild midepigastric tenderness, tenderness to palpation at the right mid and lower quadrants, all other quadrants unremarkable, normal bowel sounds  MS: Joints unremarkable.   Skin: No lesions    Assessment:  (R11.10,  R19.7) Vomiting and diarrhea (primary encounter diagnosis)  Comment: Persistent loose stools, undetermined etiology. Weight is stable. ?Collagenous colitis, celiac, SIBO, other  Plan: ondansetron (ZOFRAN ODT) 4 MG ODT tab, Adult GI         Referral - Consult Only        Medication refilled. Referring to [facility] as [facility] GI team is booked out for 1 year. Update me with date of your GI appointment. Recommend daily metamucil.    (K21.9) Gastroesophageal reflux disease without esophagitis    Comment: Improving with omeprazole use.  Plan: Adult GI  Referral - Consult Only        Continue omeprazole.     (R05.1) Acute cough  Comment: She reports persistent cough from a recent URI, requesting inhaler, normal clinical exam.  Plan: albuterol (PROAIR  HFA/PROVENTIL HFA/VENTOLIN         HFA) 108 (90 Base) MCG/ACT inhaler        Medication refilled for PRN use.     28 minutes spend on the date of this encounter doing chart review, history and exam, documentation and further activities as noted above.     Dara Pickett MD  Internal Medicine/Pediatrics      I, Erica Cunningham, am serving as a scribe to document services personally performed by Dr. Dara Pcikett, based on data collection and the provider's statements to me. Dr. Pickett has reviewed, edited, and approved the above note.

## 2022-12-28 NOTE — NURSING NOTE
27 year old  Chief Complaint   Patient presents with     Gastrointestinal Problem     Follow up on abdominal pain and FMLA       Blood pressure (!) 130/92, pulse 85, temperature 97.7  F (36.5  C), temperature source Skin, resp. rate 15, weight 58.1 kg (128 lb), last menstrual period 11/23/2022, SpO2 96 %, not currently breastfeeding. Body mass index is 19.86 kg/m .  Patient Active Problem List   Diagnosis     Migraine with aura and without status migrainosus, not intractable     Mild intermittent asthma without complication     Dysmenorrhea     Generalized anxiety disorder     Raynaud's disease     Adjustment disorder with mixed anxiety and depressed mood     Carcinoid tumor     Moderate alcohol consumption     Gastroesophageal reflux disease without esophagitis     Acute maxillary sinusitis     Asthma     Lower back pain     Neck pain       Wt Readings from Last 2 Encounters:   12/28/22 58.1 kg (128 lb)   12/07/22 57.2 kg (126 lb 0.6 oz)     BP Readings from Last 3 Encounters:   12/28/22 (!) 130/92   12/07/22 123/74   12/05/22 131/83         Current Outpatient Medications   Medication     norethindrone (MICRONOR) 0.35 MG tablet     omeprazole (PRILOSEC) 40 MG DR capsule     ondansetron (ZOFRAN ODT) 4 MG ODT tab     No current facility-administered medications for this visit.       Social History     Tobacco Use     Smoking status: Every Day     Types: Vaping Device     Smokeless tobacco: Never   Substance Use Topics     Alcohol use: Yes     Comment: occasional     Drug use: No       Health Maintenance Due   Topic Date Due     NICOTINE/TOBACCO CESSATION COUNSELING Q 1 YR  Never done     YEARLY PREVENTIVE VISIT  Never done     ADVANCE CARE PLANNING  Never done     Pneumococcal Vaccine: Pediatrics (0 to 5 Years) and At-Risk Patients (6 to 64 Years) (1 - PCV) Never done     HIV SCREENING  Never done     HEPATITIS C SCREENING  Never done     DTAP/TDAP/TD IMMUNIZATION (7 - Td or Tdap) 07/30/2019     COVID-19 Vaccine (4 -  Booster for Moderna series) 02/11/2022     INFLUENZA VACCINE (1) 09/01/2022       Lab Results   Component Value Date    PAP NIL 05/31/2019 December 28, 2022 4:09 PM

## 2022-12-29 DIAGNOSIS — R05.1 ACUTE COUGH: ICD-10-CM

## 2022-12-29 NOTE — TELEPHONE ENCOUNTER
A user error has taken place: encounter opened in error, closed for administrative reasons.    Leela Gr LPN on 12/29/2022 at 9:47 AM

## 2023-01-16 ENCOUNTER — HOSPITAL ENCOUNTER (EMERGENCY)
Facility: HOSPITAL | Age: 28
Discharge: HOME | End: 2023-01-16
Attending: EMERGENCY MEDICINE | Admitting: EMERGENCY MEDICINE
Payer: COMMERCIAL

## 2023-01-16 VITALS
HEART RATE: 76 BPM | SYSTOLIC BLOOD PRESSURE: 115 MMHG | RESPIRATION RATE: 18 BRPM | BODY MASS INDEX: 20.4 KG/M2 | WEIGHT: 130 LBS | DIASTOLIC BLOOD PRESSURE: 71 MMHG | OXYGEN SATURATION: 97 % | TEMPERATURE: 98.1 F | HEIGHT: 67 IN

## 2023-01-16 DIAGNOSIS — R11.10 VOMITING AND DIARRHEA: ICD-10-CM

## 2023-01-16 DIAGNOSIS — A08.4 VIRAL GASTROENTERITIS: ICD-10-CM

## 2023-01-16 DIAGNOSIS — R19.7 VOMITING AND DIARRHEA: ICD-10-CM

## 2023-01-16 PROBLEM — G43.009 MIGRAINE WITHOUT AURA: Status: ACTIVE | Noted: 2023-01-16

## 2023-01-16 LAB
ALBUMIN SERPL BCG-MCNC: 4.4 G/DL (ref 3.5–5.2)
ALP SERPL-CCNC: 60 U/L (ref 35–104)
ALT SERPL W P-5'-P-CCNC: 18 U/L (ref 10–35)
ANION GAP SERPL CALCULATED.3IONS-SCNC: 12 MMOL/L (ref 7–15)
AST SERPL W P-5'-P-CCNC: 25 U/L (ref 10–35)
BASOPHILS # BLD AUTO: 0 10E3/UL (ref 0–0.2)
BASOPHILS NFR BLD AUTO: 0 %
BILIRUB SERPL-MCNC: 0.3 MG/DL
BUN SERPL-MCNC: 8.5 MG/DL (ref 6–20)
CALCIUM SERPL-MCNC: 8.8 MG/DL (ref 8.6–10)
CHLORIDE SERPL-SCNC: 103 MMOL/L (ref 98–107)
CREAT SERPL-MCNC: 0.61 MG/DL (ref 0.51–0.95)
DEPRECATED HCO3 PLAS-SCNC: 25 MMOL/L (ref 22–29)
EOSINOPHIL # BLD AUTO: 0 10E3/UL (ref 0–0.7)
EOSINOPHIL NFR BLD AUTO: 0 %
ERYTHROCYTE [DISTWIDTH] IN BLOOD BY AUTOMATED COUNT: 12.3 % (ref 10–15)
GFR SERPL CREATININE-BSD FRML MDRD: >90 ML/MIN/1.73M2
GLUCOSE SERPL-MCNC: 112 MG/DL (ref 70–99)
HCG SERPL QL: NEGATIVE
HCT VFR BLD AUTO: 43.3 % (ref 35–47)
HGB BLD-MCNC: 14.7 G/DL (ref 11.7–15.7)
IMM GRANULOCYTES # BLD: 0.1 10E3/UL
IMM GRANULOCYTES NFR BLD: 1 %
LIPASE SERPL-CCNC: 19 U/L (ref 13–60)
LYMPHOCYTES # BLD AUTO: 0.6 10E3/UL (ref 0.8–5.3)
LYMPHOCYTES NFR BLD AUTO: 6 %
MCH RBC QN AUTO: 33.6 PG (ref 26.5–33)
MCHC RBC AUTO-ENTMCNC: 33.9 G/DL (ref 31.5–36.5)
MCV RBC AUTO: 99 FL (ref 78–100)
MONOCYTES # BLD AUTO: 0.5 10E3/UL (ref 0–1.3)
MONOCYTES NFR BLD AUTO: 6 %
NEUTROPHILS # BLD AUTO: 8.4 10E3/UL (ref 1.6–8.3)
NEUTROPHILS NFR BLD AUTO: 87 %
NRBC # BLD AUTO: 0 10E3/UL
NRBC BLD AUTO-RTO: 0 /100
PLATELET # BLD AUTO: 252 10E3/UL (ref 150–450)
POTASSIUM SERPL-SCNC: 4.3 MMOL/L (ref 3.4–5.3)
PROT SERPL-MCNC: 7 G/DL (ref 6.4–8.3)
RBC # BLD AUTO: 4.38 10E6/UL (ref 3.8–5.2)
SODIUM SERPL-SCNC: 140 MMOL/L (ref 136–145)
WBC # BLD AUTO: 9.5 10E3/UL (ref 4–11)

## 2023-01-16 PROCEDURE — 258N000003 HC RX IP 258 OP 636: Performed by: EMERGENCY MEDICINE

## 2023-01-16 PROCEDURE — 80053 COMPREHEN METABOLIC PANEL: CPT | Performed by: EMERGENCY MEDICINE

## 2023-01-16 PROCEDURE — 83690 ASSAY OF LIPASE: CPT | Performed by: EMERGENCY MEDICINE

## 2023-01-16 PROCEDURE — 84703 CHORIONIC GONADOTROPIN ASSAY: CPT | Performed by: EMERGENCY MEDICINE

## 2023-01-16 PROCEDURE — 96361 HYDRATE IV INFUSION ADD-ON: CPT

## 2023-01-16 PROCEDURE — 250N000011 HC RX IP 250 OP 636: Performed by: EMERGENCY MEDICINE

## 2023-01-16 PROCEDURE — 85025 COMPLETE CBC W/AUTO DIFF WBC: CPT | Performed by: EMERGENCY MEDICINE

## 2023-01-16 PROCEDURE — C9113 INJ PANTOPRAZOLE SODIUM, VIA: HCPCS | Performed by: EMERGENCY MEDICINE

## 2023-01-16 PROCEDURE — 96375 TX/PRO/DX INJ NEW DRUG ADDON: CPT

## 2023-01-16 PROCEDURE — 36415 COLL VENOUS BLD VENIPUNCTURE: CPT | Performed by: EMERGENCY MEDICINE

## 2023-01-16 PROCEDURE — 96374 THER/PROPH/DIAG INJ IV PUSH: CPT

## 2023-01-16 PROCEDURE — 99285 EMERGENCY DEPT VISIT HI MDM: CPT | Mod: 25

## 2023-01-16 RX ORDER — MORPHINE SULFATE 4 MG/ML
4 INJECTION, SOLUTION INTRAMUSCULAR; INTRAVENOUS ONCE
Status: COMPLETED | OUTPATIENT
Start: 2023-01-16 | End: 2023-01-16

## 2023-01-16 RX ORDER — ONDANSETRON 4 MG/1
4 TABLET, ORALLY DISINTEGRATING ORAL EVERY 8 HOURS PRN
Qty: 30 TABLET | Refills: 1 | Status: SHIPPED | OUTPATIENT
Start: 2023-01-16 | End: 2024-08-08

## 2023-01-16 RX ORDER — ONDANSETRON 2 MG/ML
4 INJECTION INTRAMUSCULAR; INTRAVENOUS ONCE
Status: COMPLETED | OUTPATIENT
Start: 2023-01-16 | End: 2023-01-16

## 2023-01-16 RX ADMIN — PANTOPRAZOLE SODIUM 40 MG: 40 INJECTION, POWDER, FOR SOLUTION INTRAVENOUS at 04:32

## 2023-01-16 RX ADMIN — MORPHINE SULFATE 4 MG: 4 INJECTION INTRAVENOUS at 04:30

## 2023-01-16 RX ADMIN — SODIUM CHLORIDE 1000 ML: 9 INJECTION, SOLUTION INTRAVENOUS at 04:42

## 2023-01-16 RX ADMIN — ONDANSETRON 4 MG: 2 INJECTION INTRAMUSCULAR; INTRAVENOUS at 04:27

## 2023-01-16 ASSESSMENT — ENCOUNTER SYMPTOMS
NAUSEA: 1
ABDOMINAL PAIN: 1
COUGH: 0
VOMITING: 1
CONSTIPATION: 1
FEVER: 0
SHORTNESS OF BREATH: 0

## 2023-01-16 ASSESSMENT — ACTIVITIES OF DAILY LIVING (ADL): ADLS_ACUITY_SCORE: 35

## 2023-01-16 NOTE — DISCHARGE INSTRUCTIONS
Continue taking Zofran 4 mg every 8 hours as needed for nausea/vomiting.    Continue taking your Prilosec as well.    Maintain a clear liquid diet with fluids that contain some electrolytes such as broth, Gatorade, Pedialyte, or clear sodas.  Once you are able to tolerate these without vomiting you may advance your diet to more gentle foods such as bananas, applesauce, white crackers/toast or Jell-O.    Follow-up with your primary care doctor in 3 to 5 days if you are not getting better.  Proceed with any additional gastrointestinal testing that has been recommended by your care team.    If you develop fever greater than 100.5 degrees, blood in your stool or vomit, inability to pass gas or pass stool, severe pain that is unable to be managed with over-the-counter medications, or any other new/concerning symptoms, please return to the emergency department for evaluation.

## 2023-01-16 NOTE — ED PROVIDER NOTES
EMERGENCY DEPARTMENT ENCOUNTER      NAME: Tanya Pearson  AGE: 27 year old female  YOB: 1995  MRN: [id number]  EVALUATION DATE & TIME: 2023  3:47 AM    PCP: Dara Pickett    ED PROVIDER: Liv Martínez M.D.      Chief Complaint   Patient presents with     Vomiting         FINAL IMPRESSION:  1. Viral gastroenteritis    2. Vomiting and diarrhea        MEDICAL DECISION MAKIN:06 AM I met with the patient, obtained history, performed an initial exam, and discussed options and plan for diagnostics and treatment here in the ED.   Pertinent Labs & Imaging studies reviewed. (See chart for details)  5:22 AM I rechecked on the patient. The patient is feeling better and has not vomited. I discussed the plan for discharge. I discussed potential symptoms/reasons to return to the ED and all questions were answered. The patient is comfortable with the plan.    Tanya Pearson is a 27 year old female who presents with nausea and vomiting.  Patient has some chronic GI issues for which she is undergoing treatment.  She has been unable to control her symptoms with oral Zofran.  She denies chance of pregnancy.  She has no significant abdominal pain and palpation of her abdomen reveals benign findings.  She denies any significant alcohol use, though she did have a couple of beers at a football game she was at last night.  No known sick contacts.  She is already had her appendix out.  Vital signs show no fever, no tachycardia and a normal blood pressure.  I feel her presentation is consistent with viral gastroenteritis.  I will give her IV fluids, Zofran and Protonix to help with her symptoms.  I will consider using additional pain medications if needed.  I will evaluate for infectious process, pyelonephritis, cholecystitis, pancreatitis with blood work.  Because of generally benign presentation, no imaging is indicated at this time.    All of her labs are reassuring including normal kidney  function, electrolytes and white blood cell count.  She is not pregnant.  She responded well to the interventions and was comfortable with discharge home to continue GI work-up as an outpatient.  I refilled her Zofran prescription and discussed dietary restrictions until she is feeling better.  We also discussed warning signs and indications to return to the emergency department.  She understands these and all discharge instructions.      History:    Supplemental history from: Documented in HPI, if applicable    External Record(s) reviewed: Outpatient Record: Office visit 12-, 12-7-2022 and 12-5-2022    Work Up:    Chart documentation includes differential considered and any EKGs or imaging independently interpreted by provider.    In additional to work up documented, I considered the following work up: Imaging CT, but deferred due to Normal labs and resolution of symptoms with medical treatments..    External consultation:    Discussion of management with another provider: See chart documentation, if applicable    Complicating factors:    Care impacted by chronic illness: N/A    Care affected by social determinants of health: N/A    Disposition considerations: Discharge. I recommended the patient continue their current prescription strength medication(s): Zofran. I considered admission, but discharged patient after significant clinical improvement.        MEDICATIONS GIVEN IN THE EMERGENCY:  Medications   0.9% sodium chloride BOLUS (0 mLs Intravenous Stopped 1/16/23 0534)   pantoprazole (PROTONIX) IV push injection 40 mg (40 mg Intravenous Given 1/16/23 0432)   ondansetron (ZOFRAN) injection 4 mg (4 mg Intravenous Given 1/16/23 5887)   morphine (PF) injection 4 mg (4 mg Intravenous Given 1/16/23 9990)       =================================================================    [facility]    Patient information was obtained from: patient    Use of : Use of : N/A       Tanya Pearson is a 27  year old female with a history of appendectomy, EGD, carcinoid tumor, gastroesophageal reflux disease, asthma, and generalized anxiety disorder who presents with nausea, nausea, and abdominal pain.    Per chart review, patient presented to [facility] on 12/28/2022 for evaluation of nausea, vomiting, and diarrhea. Acid reflux was improving with daily omeprazole 40mg. An adult GI  referral was placed, as [facility] was booked out 1 year in advance. Plan was for Zofran and daily metamucil use. Discharged with albuterol sulfate for cough.    Patient presents with constant nausea and vomiting that has been occurring since waking up with it earlier in the night as well as abdominal pain. Patient has also not had a bowel movement since 1/13/2023. She feels as if she needs to have a bowel movement, but is unable to. She is still passing gas. She has had a recent endoscopy. It is normal for her to not have a bowel movement for this long. Patient reports she was feeling fine during the day on 1/15. She was with her friends at a football game. She did consume alcohol, but not too much. She went to bed feeling fine, but then woke up vomiting. She denies recent sick contacts. She denies eating any food she normally doesn't.    Patient denies fever, chest pain, shortness of breath, or cough. Patient does not report of any other medical concerns or complaints at this time.      REVIEW OF SYSTEMS   Review of Systems   Constitutional: Negative for fever.   Respiratory: Negative for cough and shortness of breath.    Cardiovascular: Negative for chest pain.   Gastrointestinal: Positive for abdominal pain, constipation, nausea and vomiting.   All other systems reviewed and are negative.       PAST MEDICAL HISTORY:  History reviewed. No pertinent past medical history.    PAST SURGICAL HISTORY:  Past Surgical History:   Procedure Laterality Date     ESOPHAGOSCOPY, GASTROSCOPY, DUODENOSCOPY (EGD), COMBINED N/A 11/28/2022     "Procedure: ESOPHAGOGASTRODUODENOSCOPY, WITH BIOPSY;  Surgeon: Lexie Peck MD;  Location: [facility] OR     LAPAROSCOPIC APPENDECTOMY N/A 4/19/2021    Procedure: APPENDECTOMY, LAPAROSCOPIC;  Surgeon: Doc Vazquez DO;  Location: [facility] OR     NO HISTORY OF SURGERY         CURRENT MEDICATIONS:    No current facility-administered medications for this encounter.    Current Outpatient Medications:      ondansetron (ZOFRAN ODT) 4 MG ODT tab, Take 1 tablet (4 mg) by mouth every 8 hours as needed for nausea, Disp: 30 tablet, Rfl: 1     albuterol (PROAIR HFA/PROVENTIL HFA/VENTOLIN HFA) 108 (90 Base) MCG/ACT inhaler, Inhale 2 puffs into the lungs every 4 hours as needed for shortness of breath or wheezing, Disp: 18 g, Rfl: 1     norethindrone (MICRONOR) 0.35 MG tablet, Take 1 tablet (0.35 mg) by mouth daily, Disp: 90 tablet, Rfl: 3     omeprazole (PRILOSEC) 40 MG DR capsule, Take 1 capsule (40 mg) by mouth daily, Disp: 90 capsule, Rfl: 1    ALLERGIES:  Allergies   Allergen Reactions     Amoxicillin Rash       FAMILY HISTORY:  Family History   Problem Relation Age of Onset     Cancer Maternal Grandmother         skin cancer     Breast Cancer Paternal Grandmother 76       SOCIAL HISTORY:   Social History     Tobacco Use     Smoking status: Every Day     Types: Vaping Device     Smokeless tobacco: Never   Substance Use Topics     Alcohol use: Yes     Comment: occasional     Drug use: No        PHYSICAL EXAM:    Vitals: /71   Pulse 76   Temp 98.1  F (36.7  C) (Temporal)   Resp 18   Ht 1.702 m (5' 7\")   Wt 59 kg (130 lb)   LMP 12/16/2022 (Approximate)   SpO2 97%   BMI 20.36 kg/m     General:. Alert and interactive, comfortable appearing.  HENT: Oropharynx without erythema or exudates. MMM.  TMs clear bilaterally.  Eyes: Pupils mid-sized and equally reactive.   Neck: Full AROM.  No midline tenderness to palpation.  Cardiovascular: Regular rate and rhythm. Peripheral pulses 2+ bilaterally.  Chest/Pulmonary: " Normal work of breathing. Lung sounds clear and equal throughout, no wheezes or crackles. No chest wall tenderness or deformities.  Abdomen: Soft, nondistended. Nontender without guarding or rebound. Hyperactive bowel sounds. No guarding. No obvious focal tenderness.  Back/Spine: No CVA or midline tenderness.  Extremities: Normal ROM of all major joints. No lower extremity edema.   Skin: Warm and dry. Normal skin color.   Neuro: Speech clear. CNs grossly intact. Moves all extremities appropriately. Strength and sensation grossly intact to all extremities.   Psych: Normal affect/mood, cooperative, memory appropriate.     LAB:  All pertinent labs reviewed and interpreted.  Labs Ordered and Resulted from Time of ED Arrival to Time of ED Departure   COMPREHENSIVE METABOLIC PANEL - Abnormal       Result Value    Sodium 140      Potassium 4.3      Chloride 103      Carbon Dioxide (CO2) 25      Anion Gap 12      Urea Nitrogen 8.5      Creatinine 0.61      Calcium 8.8      Glucose 112 (*)     Alkaline Phosphatase 60      AST 25      ALT 18      Protein Total 7.0      Albumin 4.4      Bilirubin Total 0.3      GFR Estimate >90     CBC WITH PLATELETS AND DIFFERENTIAL - Abnormal    WBC Count 9.5      RBC Count 4.38      Hemoglobin 14.7      Hematocrit 43.3      MCV 99      MCH 33.6 (*)     MCHC 33.9      RDW 12.3      Platelet Count 252      % Neutrophils 87      % Lymphocytes 6      % Monocytes 6      % Eosinophils 0      % Basophils 0      % Immature Granulocytes 1      NRBCs per 100 WBC 0      Absolute Neutrophils 8.4 (*)     Absolute Lymphocytes 0.6 (*)     Absolute Monocytes 0.5      Absolute Eosinophils 0.0      Absolute Basophils 0.0      Absolute Immature Granulocytes 0.1      Absolute NRBCs 0.0     LIPASE - Normal    Lipase 19     HCG QUALITATIVE PREGNANCY - Normal    hCG Serum Qualitative Negative         Angelito GARRIDO am serving as a scribe to document services personally performed by Dr. Liv Martínez   based on my observation and the provider's statements to me. I, Liv Martínez MD attest that Angelito Toledo is acting in a scribe capacity, has observed my performance of the services and has documented them in accordance with my direction.      Liv Martínez M.D.  Emergency Medicine  [facility] EMERGENCY DEPARTMENT  [address] [place] [phone]  Dept: [phone]         Liv Martínez MD  01/16/23 0702

## 2023-01-16 NOTE — ED TRIAGE NOTES
"Mid central ABD pain of 7 with vomiting. Started last night at midnight, \"I was throwing up bile\". Vomited 5 times.  No blood. She took 2 anti nausea pills. No BM for 3 days. No HX of bowel obstruction. HX of diarrhea.     Triage Assessment       Row Name 01/16/23 0343       Triage Assessment (Adult)    Airway WDL WDL       Respiratory WDL    Respiratory WDL WDL       Skin Circulation/Temperature WDL    Skin Circulation/Temperature WDL WDL       Cardiac WDL    Cardiac WDL WDL       Peripheral/Neurovascular WDL    Peripheral Neurovascular WDL WDL       Cognitive/Neuro/Behavioral WDL    Cognitive/Neuro/Behavioral WDL WDL                  "

## 2023-01-20 ENCOUNTER — TRANSFERRED RECORDS (OUTPATIENT)
Dept: HEALTH INFORMATION MANAGEMENT | Facility: CLINIC | Age: 28
End: 2023-01-20

## 2023-02-02 ENCOUNTER — TRANSFERRED RECORDS (OUTPATIENT)
Dept: HEALTH INFORMATION MANAGEMENT | Facility: CLINIC | Age: 28
End: 2023-02-02
Payer: COMMERCIAL

## 2023-02-03 ENCOUNTER — TRANSFERRED RECORDS (OUTPATIENT)
Dept: HEALTH INFORMATION MANAGEMENT | Facility: CLINIC | Age: 28
End: 2023-02-03
Payer: COMMERCIAL

## 2023-02-22 DIAGNOSIS — K21.9 GASTROESOPHAGEAL REFLUX DISEASE, UNSPECIFIED WHETHER ESOPHAGITIS PRESENT: ICD-10-CM

## 2023-02-22 RX ORDER — OMEPRAZOLE 40 MG/1
CAPSULE, DELAYED RELEASE ORAL
Qty: 180 CAPSULE | Refills: 1 | COMMUNITY
Start: 2023-02-22 | End: 2024-08-08

## 2023-04-01 ENCOUNTER — HEALTH MAINTENANCE LETTER (OUTPATIENT)
Age: 28
End: 2023-04-01

## 2023-04-21 ENCOUNTER — TELEPHONE (OUTPATIENT)
Dept: GASTROENTEROLOGY | Facility: CLINIC | Age: 28
End: 2023-04-21
Payer: COMMERCIAL

## 2023-04-21 NOTE — TELEPHONE ENCOUNTER
Unable to LVM to inform patient that their appt with Dr. Hunt on 12/20/23 will be pushed back 20 mins due to a template change. Sent mychart with call center # if pt has any questions or wants a sooner appointment

## 2023-12-12 NOTE — TELEPHONE ENCOUNTER
REFERRAL INFORMATION:  Referring Provider:  Dr Pickett  Referring Clinic:  [facility]  Reason for Visit/Diagnosis: *Dx:RLQ abdominal pain, Loose stools, Hiatal hernia, Gastroesophageal reflux disease with esophagitis without hemorrhage      FUTURE VISIT INFORMATION:  Appointment Date: 12/20/2  Appointment Time: 4:20pm     NOTES STATUS DETAILS   OFFICE NOTE from Referring Provider Internal Dr Dara Pickett @ [facility]:  12/7/22  11/18/22  10/31/22   OFFICE NOTE from Other Specialist Received/CE Keke ELAINE @ [facility]:  1/20/23    Dr Felipa Rhoades @ [facility]:  8/5/21   HOSPITAL DISCHARGE SUMMARY/  ED VISITS Internal [facility]:  1/16/23 9/6/22   MEDICATION LIST Internal         ENDOSCOPY  Received/Internal [facility]:  2/3/23    [facility]:  11/28/22   PERTINENT LABS Received/Internal [facility], Internal   IMAGING (CT, MRI, EGD, MRCP, Small Bowel Follow Through/SBT, MR/CT Enterography) PACS [facility]:  CT Ab Pelvis 8/6/22  CT Chest Abd 5/1/21  CT Abd Pelvis 4/18/21

## 2023-12-20 ENCOUNTER — PRE VISIT (OUTPATIENT)
Dept: GASTROENTEROLOGY | Facility: CLINIC | Age: 28
End: 2023-12-20

## 2024-01-25 ENCOUNTER — OFFICE VISIT (OUTPATIENT)
Dept: FAMILY MEDICINE | Facility: CLINIC | Age: 29
End: 2024-01-25
Payer: COMMERCIAL

## 2024-01-25 VITALS
TEMPERATURE: 99.3 F | OXYGEN SATURATION: 96 % | DIASTOLIC BLOOD PRESSURE: 101 MMHG | SYSTOLIC BLOOD PRESSURE: 141 MMHG | HEART RATE: 105 BPM

## 2024-01-25 DIAGNOSIS — K52.9 GASTROENTERITIS: Primary | ICD-10-CM

## 2024-01-25 PROCEDURE — 99213 OFFICE O/P EST LOW 20 MIN: CPT

## 2024-01-25 RX ORDER — ONDANSETRON 4 MG/1
4 TABLET, ORALLY DISINTEGRATING ORAL EVERY 8 HOURS PRN
Qty: 15 TABLET | Refills: 0 | Status: SHIPPED | OUTPATIENT
Start: 2024-01-25 | End: 2024-08-08

## 2024-01-25 NOTE — PROGRESS NOTES
Assessment & Plan     Gastroenteritis  Viral gastroenteritis/diarrhea.  Discussed conservative measures, BRAT diet, advance as tolerated.    No indication for stool samples at this time but if it were to persist greater than 10-14 days should be reassessed.    PI given and discussed.  Refilled zofran for nausea.    At the end of the encounter, I discussed results, diagnosis, medications. Discussed red flags for immediate return to clinic/ER, as well as indications for follow up if no improvement. Patient understood and agreed to plan.    Note for work provided.  She may return when sx improved for 24 hours.       [facility]    Jose Martínez is a 28 year old female who presents to clinic today for the following health issues:  Chief Complaint   Patient presents with    Urgent Care     Nausea, diarrhea, watery stool x Tuesday      HPI    Diarrhea x 5 per day for the last 3 days.     Watery at times.  No BRBPR.   No abx recently, no exposures known.    No travel.    No animal exposure.    No exposure to C diff or recent hospitalizations.    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    No fevers.    No vomiting but is nauseated.    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  Has been followed by [facility], has some chronic refluix and gastritis history.    She is taking fluids well.        Review of Systems  Constitutional, HEENT, cardiovascular, pulmonary, gi and gu systems are negative, except as otherwise noted.      Objective    BP (!) 141/101   Pulse 105   Temp 99.3  F (37.4  C) (Tympanic)   SpO2 96%   Physical Exam   Patient is in no acute distress and appears well.  No CVA tenderness is present.  Abdomen is soft nontender to light or deep palpation no masses or hepatosplenomegaly present.  Mm  moist, skin turger good.

## 2024-01-25 NOTE — LETTER
January 25, 2024      Tanya Pearson  [address] [place] [address]        To Whom It May Concern:    Tanya Pearson was seen in our clinic today due to illness thus unable to attend work.  She may return when symptoms have improved for 24 hours which may take several more days.            Sincerely,      Germaine Monroe PA-C

## 2024-01-31 ENCOUNTER — OFFICE VISIT (OUTPATIENT)
Dept: FAMILY MEDICINE | Facility: CLINIC | Age: 29
End: 2024-01-31
Payer: COMMERCIAL

## 2024-01-31 VITALS
SYSTOLIC BLOOD PRESSURE: 129 MMHG | RESPIRATION RATE: 15 BRPM | HEIGHT: 67 IN | HEART RATE: 90 BPM | BODY MASS INDEX: 20.4 KG/M2 | OXYGEN SATURATION: 99 % | TEMPERATURE: 98 F | DIASTOLIC BLOOD PRESSURE: 85 MMHG | WEIGHT: 130 LBS

## 2024-01-31 DIAGNOSIS — R11.0 NAUSEA: ICD-10-CM

## 2024-01-31 DIAGNOSIS — R19.7 DIARRHEA, UNSPECIFIED TYPE: Primary | ICD-10-CM

## 2024-01-31 DIAGNOSIS — D69.6 THROMBOCYTOPENIA (H): ICD-10-CM

## 2024-01-31 LAB
BASOPHILS # BLD AUTO: 0 10E3/UL (ref 0–0.2)
BASOPHILS NFR BLD AUTO: 0 %
EOSINOPHIL # BLD AUTO: 0 10E3/UL (ref 0–0.7)
EOSINOPHIL NFR BLD AUTO: 0 %
ERYTHROCYTE [DISTWIDTH] IN BLOOD BY AUTOMATED COUNT: 12 % (ref 10–15)
HCT VFR BLD AUTO: 45.3 % (ref 35–47)
HGB BLD-MCNC: 15.2 G/DL (ref 11.7–15.7)
IMM GRANULOCYTES # BLD: 0 10E3/UL
IMM GRANULOCYTES NFR BLD: 0 %
LYMPHOCYTES # BLD AUTO: 1.6 10E3/UL (ref 0.8–5.3)
LYMPHOCYTES NFR BLD AUTO: 22 %
MCH RBC QN AUTO: 32.4 PG (ref 26.5–33)
MCHC RBC AUTO-ENTMCNC: 33.6 G/DL (ref 31.5–36.5)
MCV RBC AUTO: 97 FL (ref 78–100)
MONOCYTES # BLD AUTO: 0.5 10E3/UL (ref 0–1.3)
MONOCYTES NFR BLD AUTO: 7 %
NEUTROPHILS # BLD AUTO: 4.9 10E3/UL (ref 1.6–8.3)
NEUTROPHILS NFR BLD AUTO: 71 %
NRBC # BLD AUTO: 0 10E3/UL
NRBC BLD AUTO-RTO: 0 /100
PLATELET # BLD AUTO: 145 10E3/UL (ref 150–450)
RBC # BLD AUTO: 4.69 10E6/UL (ref 3.8–5.2)
WBC # BLD AUTO: 7.2 10E3/UL (ref 4–11)

## 2024-01-31 PROCEDURE — 80048 BASIC METABOLIC PNL TOTAL CA: CPT | Performed by: INTERNAL MEDICINE

## 2024-01-31 PROCEDURE — 85025 COMPLETE CBC W/AUTO DIFF WBC: CPT | Performed by: INTERNAL MEDICINE

## 2024-01-31 PROCEDURE — 80053 COMPREHEN METABOLIC PANEL: CPT | Performed by: INTERNAL MEDICINE

## 2024-01-31 ASSESSMENT — ASTHMA QUESTIONNAIRES
QUESTION_1 LAST FOUR WEEKS HOW MUCH OF THE TIME DID YOUR ASTHMA KEEP YOU FROM GETTING AS MUCH DONE AT WORK, SCHOOL OR AT HOME: NONE OF THE TIME
ACT_TOTALSCORE: 25
QUESTION_4 LAST FOUR WEEKS HOW OFTEN HAVE YOU USED YOUR RESCUE INHALER OR NEBULIZER MEDICATION (SUCH AS ALBUTEROL): NOT AT ALL
QUESTION_5 LAST FOUR WEEKS HOW WOULD YOU RATE YOUR ASTHMA CONTROL: COMPLETELY CONTROLLED
QUESTION_3 LAST FOUR WEEKS HOW OFTEN DID YOUR ASTHMA SYMPTOMS (WHEEZING, COUGHING, SHORTNESS OF BREATH, CHEST TIGHTNESS OR PAIN) WAKE YOU UP AT NIGHT OR EARLIER THAN USUAL IN THE MORNING: NOT AT ALL
ACT_TOTALSCORE: 25
QUESTION_2 LAST FOUR WEEKS HOW OFTEN HAVE YOU HAD SHORTNESS OF BREATH: NOT AT ALL

## 2024-01-31 NOTE — PROGRESS NOTES
Tanya Pearson is a 28 year old female with hx of migraine, asthma, dysmenorrhea, anxiety, hx of carcinoid tumor, moderate Etoh consumption, GERD, sinusitis. She is here for the following issues:       Diarrhea/ Nausea  Seen at  1/25/24 for 3 d of viral gastroenteritis symptoms  Abrupt onset of symptoms  Symptoms began with fatigue followed by diarrhea the first night, 15x not bloody  Nauseted no vomiting  Tried imodium 2 tablet, slowed it down  During urgent care visit, reported cramping, watery stools 3-5x per day  Loose stools not necessarily triggered by eating  Tanya works with adults with disabilities, unclear if she was exposed to anyone  No URI symptoms, no conjunctivitis.  On day 9 of symptoms, still having 3-5 loose stools per day  Using zofran for treatment of nausea,with some relief  Appetite is poor, forcing selft to eat  No rash or joint pain  Awakening from sleep to have BM    Patient Active Problem List   Diagnosis    Migraine with aura and without status migrainosus, not intractable    Mild intermittent asthma without complication    Dysmenorrhea    Generalized anxiety disorder    Raynaud's disease    Adjustment disorder with mixed anxiety and depressed mood    Carcinoid tumor (H28)    Moderate alcohol consumption    Gastroesophageal reflux disease without esophagitis    Acute maxillary sinusitis    Asthma    Lower back pain    Neck pain    Migraine without aura       Current Outpatient Medications   Medication Sig Dispense Refill    albuterol (PROAIR HFA/PROVENTIL HFA/VENTOLIN HFA) 108 (90 Base) MCG/ACT inhaler Inhale 2 puffs into the lungs every 4 hours as needed for shortness of breath or wheezing 18 g 1    norethindrone (MICRONOR) 0.35 MG tablet Take 1 tablet (0.35 mg) by mouth daily 90 tablet 3    omeprazole (PRILOSEC) 40 MG DR capsule Take one po BID per [facility],  1/2023 180 capsule 1    ondansetron (ZOFRAN ODT) 4 MG ODT tab Take 1 tablet (4 mg) by mouth every 8 hours as needed for  "nausea 15 tablet 0    ondansetron (ZOFRAN ODT) 4 MG ODT tab Take 1 tablet (4 mg) by mouth every 8 hours as needed for nausea 30 tablet 1       Allergies   Allergen Reactions    Amoxicillin Rash        EXAM  /85 (BP Location: Right arm, Patient Position: Sitting, Cuff Size: Adult Regular)   Pulse 90   Temp 98  F (36.7  C) (Skin)   Resp 15   Ht 1.702 m (5' 7\")   Wt 59 kg (130 lb)   LMP 01/18/2024 (Exact Date)   SpO2 99%   BMI 20.36 kg/m    Gen: Alert, pleasant, NAD  COR: S1,S2, no murmur  Lungs: CTA bilaterally, no rhonchi, wheezes or rales  Abdomen: Soft, non tender, normal bowel sounds, no HSM or mass      Assessment:  (R19.7) Diarrhea, unspecified type  (primary encounter diagnosis)  (R11.0) Nausea  Comment: day 9 of persistent watery stools , 3-5 per day, nausea, poor appetite  Plan: Enteric Bacteria and Virus Panel by [facility] Stool,         C. difficile Toxin B PCR with reflex to C.         difficile Antigen and Toxins A/B EIA, CBC with         platelets differential, Basic metabolic panel        Will check stool cultures, kidney function, CBC    (D69.6) Thrombocytopenia (H24)  Comment: plt count 145, could be secondary to viral illness vs liver disease  Plan: Hepatic panel        Check liver enzymes.     Dara Pickett MD  Internal Medicine/Pediatrics    "

## 2024-01-31 NOTE — NURSING NOTE
"28 year old  Chief Complaint   Patient presents with    Nausea     Pt reports nausea and diarrhea as of last Tuesday. The sx are not worsened by foods. Sx have not relieved.        Blood pressure 129/85, pulse 90, temperature 98  F (36.7  C), temperature source Skin, resp. rate 15, height 1.702 m (5' 7\"), weight 59 kg (130 lb), last menstrual period 01/18/2024, SpO2 99%, not currently breastfeeding. Body mass index is 20.36 kg/m .  Patient Active Problem List   Diagnosis    Migraine with aura and without status migrainosus, not intractable    Mild intermittent asthma without complication    Dysmenorrhea    Generalized anxiety disorder    Raynaud's disease    Adjustment disorder with mixed anxiety and depressed mood    Carcinoid tumor (H28)    Moderate alcohol consumption    Gastroesophageal reflux disease without esophagitis    Acute maxillary sinusitis    Asthma    Lower back pain    Neck pain    Migraine without aura       Wt Readings from Last 2 Encounters:   01/31/24 59 kg (130 lb)   01/16/23 59 kg (130 lb)     BP Readings from Last 3 Encounters:   01/31/24 129/85   01/25/24 (!) 141/101   01/16/23 115/71         Current Outpatient Medications   Medication    albuterol (PROAIR HFA/PROVENTIL HFA/VENTOLIN HFA) 108 (90 Base) MCG/ACT inhaler    omeprazole (PRILOSEC) 40 MG DR capsule    ondansetron (ZOFRAN ODT) 4 MG ODT tab    norethindrone (MICRONOR) 0.35 MG tablet    ondansetron (ZOFRAN ODT) 4 MG ODT tab     No current facility-administered medications for this visit.       Social History     Tobacco Use    Smoking status: Every Day     Types: Vaping Device    Smokeless tobacco: Never   Vaping Use    Vaping Use: Never used   Substance Use Topics    Alcohol use: Yes     Comment: occasional    Drug use: No       Health Maintenance Due   Topic Date Due    NICOTINE/TOBACCO CESSATION COUNSELING Q 1 YR  Never done    YEARLY PREVENTIVE VISIT  Never done    ADVANCE CARE PLANNING  Never done    Pneumococcal Vaccine: " Pediatrics (0 to 5 Years) and At-Risk Patients (6 to 64 Years) (1 of 2 - PCV) Never done    HIV SCREENING  Never done    HEPATITIS C SCREENING  Never done    DTAP/TDAP/TD IMMUNIZATION (7 - Td or Tdap) 07/30/2019    INFLUENZA VACCINE (1) 09/01/2023    COVID-19 Vaccine (4 - 2023-24 season) 09/01/2023    ASTHMA ACTION PLAN  10/31/2023       Lab Results   Component Value Date    PAP NIL 05/31/2019 January 31, 2024 3:06 PM

## 2024-01-31 NOTE — LETTER
January 31, 2024    RE: Tanya Pearson  [address] [place] [address]          To whom it may concern,      I am the primary care doctor for Tanya Pearson. She was seen in clinic on January 31, 2024 for illness.      She should not return to work until Monday Feb 5,2024.      Sincerely,            Dara Pickett MD  Internal Medicine/Pediatrics

## 2024-02-01 LAB
ALBUMIN SERPL BCG-MCNC: 4.7 G/DL (ref 3.5–5.2)
ALP SERPL-CCNC: 67 U/L (ref 40–150)
ALT SERPL W P-5'-P-CCNC: 18 U/L (ref 0–50)
ANION GAP SERPL CALCULATED.3IONS-SCNC: 12 MMOL/L (ref 7–15)
AST SERPL W P-5'-P-CCNC: 20 U/L (ref 0–45)
BILIRUB DIRECT SERPL-MCNC: <0.2 MG/DL (ref 0–0.3)
BILIRUB SERPL-MCNC: 0.6 MG/DL
BUN SERPL-MCNC: 14.3 MG/DL (ref 6–20)
CALCIUM SERPL-MCNC: 9.7 MG/DL (ref 8.6–10)
CHLORIDE SERPL-SCNC: 101 MMOL/L (ref 98–107)
CREAT SERPL-MCNC: 0.58 MG/DL (ref 0.51–0.95)
DEPRECATED HCO3 PLAS-SCNC: 25 MMOL/L (ref 22–29)
EGFRCR SERPLBLD CKD-EPI 2021: >90 ML/MIN/1.73M2
GLUCOSE SERPL-MCNC: 93 MG/DL (ref 70–99)
POTASSIUM SERPL-SCNC: 4.3 MMOL/L (ref 3.4–5.3)
PROT SERPL-MCNC: 7.4 G/DL (ref 6.4–8.3)
SODIUM SERPL-SCNC: 138 MMOL/L (ref 135–145)

## 2024-05-28 ENCOUNTER — OFFICE VISIT (OUTPATIENT)
Dept: FAMILY MEDICINE | Facility: CLINIC | Age: 29
End: 2024-05-28
Payer: COMMERCIAL

## 2024-05-28 VITALS
OXYGEN SATURATION: 98 % | SYSTOLIC BLOOD PRESSURE: 121 MMHG | HEART RATE: 81 BPM | DIASTOLIC BLOOD PRESSURE: 78 MMHG | TEMPERATURE: 98.7 F

## 2024-05-28 DIAGNOSIS — J02.9 SORE THROAT: ICD-10-CM

## 2024-05-28 DIAGNOSIS — R50.9 FEVER, UNSPECIFIED FEVER CAUSE: ICD-10-CM

## 2024-05-28 DIAGNOSIS — R05.1 ACUTE COUGH: Primary | ICD-10-CM

## 2024-05-28 LAB
FLUAV RNA SPEC QL NAA+PROBE: NEGATIVE
FLUBV RNA RESP QL NAA+PROBE: NEGATIVE
RSV RNA SPEC NAA+PROBE: NEGATIVE
SARS-COV-2 RNA RESP QL NAA+PROBE: NEGATIVE

## 2024-05-28 RX ORDER — ALBUTEROL SULFATE 90 UG/1
2 AEROSOL, METERED RESPIRATORY (INHALATION) EVERY 6 HOURS PRN
Qty: 18 G | Refills: 1 | Status: SHIPPED | OUTPATIENT
Start: 2024-05-28

## 2024-05-28 NOTE — PROGRESS NOTES
Assessment & Plan     Tanya was seen today for cough.    Diagnoses and all orders for this visit:    Acute cough  -     Symptomatic Influenza A/B, RSV, & SARS-CoV2 PCR (COVID-19); Future  -     albuterol (PROAIR HFA/PROVENTIL HFA/VENTOLIN HFA) 108 (90 Base) MCG/ACT inhaler; Inhale 2 puffs into the lungs every 6 hours as needed for shortness of breath, wheezing or cough    Fever, unspecified fever cause  -     Symptomatic Influenza A/B, RSV, & SARS-CoV2 PCR (COVID-19); Future    Sore throat  -     Symptomatic Influenza A/B, RSV, & SARS-CoV2 PCR (COVID-19); Future      Patient is has 1/5 Centor criteria today so strep swab not completed  Test for viral etiology via triple swab as above  Work note provided  Trial of albuterol given scattered end-expiratory wheezing on exam      Subjective   Tanya is a 29 year old, presenting for the following health issues:  Cough (Last week had sore throat, Friday developed cough and lost voice on Saturday. Still coughing and throat pain. Sharp chest pain and hard to breath. Negative COVID test. )    HPI     Acute Illness  Acute illness concerns: sore throat x 1 week. Friday - developed cough. Very persistent. Lost voice - this happened 2-3 days ago after cough starte. Sharp CP. Chills and sweats.     Shortness of breath but no wheezing  History of asthma - uses albuterol as needed  Loss of appetite x 1 week. Was hard to swallow  Onset/Duration: 1 week  Symptoms:  Fever: YES - yesterday  Chills/Sweats: YES  Headache (location?): YES  Sinus Pressure: No, Sunday head felt full and ears plugs  Conjunctivitis:  No  Ear Pain: no  Rhinorrhea: YES, intermittent  Congestion: No  Sore Throat: YES, staying about the same  Cough: YES-non-productive  Wheeze: No  Decreased Appetite: YES  Nausea: No  Vomiting: No  Diarrhea: No  Dysuria/Freq.: No  Dysuria or Hematuria: No  Fatigue/Achiness: YES  Sick/Strep Exposure: YES - sister has a cough  Therapies tried and outcome: taking Advil cold and  sinus, Benadryl, cough syrup, cough drops. Nothing has helped. Sleep has been disrupted    No flu vaccine this year        Objective    /78 (BP Location: Left arm, Patient Position: Sitting, Cuff Size: Adult Regular)   Pulse 81   Temp 98.7  F (37.1  C) (Skin)   SpO2 98%   There is no height or weight on file to calculate BMI.  Physical Exam   GENERAL: alert and no distress  EYES: Eyes grossly normal to inspection, PERRL and conjunctivae and sclerae normal  HENT: ear canals and TM's normal, nose and mouth without ulcers or lesions  NECK: mild adenopathy, minimally tender, no asymmetry, masses, or scars  RESP: Scattered end-expiratory wheezes that clear with coughing, otherwise clear  CV: regular rate and rhythm, normal S1 S2, no S3 or S4, no murmur, click or rub, no peripheral edema  MS: no gross musculoskeletal defects noted, no edema  SKIN: no suspicious lesions or rashes  NEURO: Normal strength and tone, mentation intact and speech normal  PSYCH: mentation appears normal, affect normal/bright    Signed Electronically by: Milagros Jay MD

## 2024-05-28 NOTE — LETTER
Return to  Work Release    Date: 5/28/2024      Name: Tanya Pearson                       YOB: 1995    Medical Record Number: [id number]    The patient was seen at the [facility] on 5/28.    Please excuse her from work on 5/28- 5/29 due to acute illness.    Patient can return to work: when fever free for 24 hours without use of medication and improving symptoms.    Please contact my office with any questions or concerns.    Sincerely,          Milagros Jay MD

## 2024-05-28 NOTE — NURSING NOTE
29 year old  Chief Complaint   Patient presents with    Cough     Last week had sore throat, Friday developed cough and lost voice on Saturday. Still coughing and throat pain. Sharp chest pain and hard to breath. Negative COVID test.        Blood pressure 121/78, pulse 81, temperature 98.7  F (37.1  C), temperature source Skin, SpO2 98%, not currently breastfeeding. There is no height or weight on file to calculate BMI.  Patient Active Problem List   Diagnosis    Migraine with aura and without status migrainosus, not intractable    Mild intermittent asthma without complication    Dysmenorrhea    Generalized anxiety disorder    Raynaud's disease    Adjustment disorder with mixed anxiety and depressed mood    Carcinoid tumor (H28)    Moderate alcohol consumption    Gastroesophageal reflux disease without esophagitis    Acute maxillary sinusitis    Asthma    Lower back pain    Neck pain    Migraine without aura       Wt Readings from Last 2 Encounters:   01/31/24 59 kg (130 lb)   01/16/23 59 kg (130 lb)     BP Readings from Last 3 Encounters:   05/28/24 121/78   01/31/24 129/85   01/25/24 (!) 141/101         Current Outpatient Medications   Medication Sig Dispense Refill    omeprazole (PRILOSEC) 40 MG DR capsule Take one po BID per [facility],  1/2023 180 capsule 1    ondansetron (ZOFRAN ODT) 4 MG ODT tab Take 1 tablet (4 mg) by mouth every 8 hours as needed for nausea 30 tablet 1    albuterol (PROAIR HFA/PROVENTIL HFA/VENTOLIN HFA) 108 (90 Base) MCG/ACT inhaler Inhale 2 puffs into the lungs every 4 hours as needed for shortness of breath or wheezing (Patient not taking: Reported on 5/28/2024) 18 g 1    norethindrone (MICRONOR) 0.35 MG tablet Take 1 tablet (0.35 mg) by mouth daily (Patient not taking: Reported on 1/31/2024) 90 tablet 3    ondansetron (ZOFRAN ODT) 4 MG ODT tab Take 1 tablet (4 mg) by mouth every 8 hours as needed for nausea (Patient not taking: Reported on 1/31/2024) 15 tablet 0     No current  facility-administered medications for this visit.       Social History     Tobacco Use    Smoking status: Every Day     Types: Vaping Device    Smokeless tobacco: Never   Vaping Use    Vaping status: Never Used   Substance Use Topics    Alcohol use: Yes     Comment: occasional    Drug use: No       Health Maintenance Due   Topic Date Due    NICOTINE/TOBACCO CESSATION COUNSELING Q 1 YR  Never done    YEARLY PREVENTIVE VISIT  Never done    ADVANCE CARE PLANNING  Never done    Pneumococcal Vaccine: Pediatrics (0 to 5 Years) and At-Risk Patients (6 to 64 Years) (1 of 2 - PCV) Never done    HIV SCREENING  Never done    HEPATITIS C SCREENING  Never done    DTAP/TDAP/TD IMMUNIZATION (7 - Td or Tdap) 07/30/2019    COVID-19 Vaccine (4 - 2023-24 season) 09/01/2023    ASTHMA ACTION PLAN  10/31/2023    PAP  10/08/2024       Lab Results   Component Value Date    PAP NIL 05/31/2019         May 28, 2024 3:39 PM

## 2024-06-01 ENCOUNTER — HEALTH MAINTENANCE LETTER (OUTPATIENT)
Age: 29
End: 2024-06-01

## 2024-08-19 ENCOUNTER — APPOINTMENT (OUTPATIENT)
Dept: CT IMAGING | Facility: HOSPITAL | Age: 29
End: 2024-08-19
Attending: FAMILY MEDICINE
Payer: COMMERCIAL

## 2024-08-19 ENCOUNTER — HOSPITAL ENCOUNTER (EMERGENCY)
Facility: HOSPITAL | Age: 29
Discharge: HOME | End: 2024-08-19
Attending: FAMILY MEDICINE | Admitting: FAMILY MEDICINE
Payer: COMMERCIAL

## 2024-08-19 VITALS
RESPIRATION RATE: 20 BRPM | BODY MASS INDEX: 19.48 KG/M2 | SYSTOLIC BLOOD PRESSURE: 123 MMHG | HEART RATE: 64 BPM | OXYGEN SATURATION: 100 % | DIASTOLIC BLOOD PRESSURE: 72 MMHG | TEMPERATURE: 98.7 F | HEIGHT: 67 IN | WEIGHT: 124.1 LBS

## 2024-08-19 DIAGNOSIS — R10.84 GENERALIZED ABDOMINAL PAIN: ICD-10-CM

## 2024-08-19 LAB
ALBUMIN SERPL BCG-MCNC: 4.5 G/DL (ref 3.5–5.2)
ALBUMIN UR-MCNC: NEGATIVE MG/DL
ALP SERPL-CCNC: 58 U/L (ref 40–150)
ALT SERPL W P-5'-P-CCNC: 15 U/L (ref 0–50)
ANION GAP SERPL CALCULATED.3IONS-SCNC: 12 MMOL/L (ref 7–15)
APPEARANCE UR: CLEAR
AST SERPL W P-5'-P-CCNC: 16 U/L (ref 0–45)
BACTERIA #/AREA URNS HPF: ABNORMAL /HPF
BASOPHILS # BLD AUTO: 0 10E3/UL (ref 0–0.2)
BASOPHILS NFR BLD AUTO: 1 %
BILIRUB DIRECT SERPL-MCNC: 0.22 MG/DL (ref 0–0.3)
BILIRUB SERPL-MCNC: 1.1 MG/DL
BILIRUB UR QL STRIP: NEGATIVE
BUN SERPL-MCNC: 11.1 MG/DL (ref 6–20)
CALCIUM SERPL-MCNC: 8.7 MG/DL (ref 8.8–10.4)
CHLORIDE SERPL-SCNC: 103 MMOL/L (ref 98–107)
COLOR UR AUTO: ABNORMAL
CREAT SERPL-MCNC: 0.69 MG/DL (ref 0.51–0.95)
EGFRCR SERPLBLD CKD-EPI 2021: >90 ML/MIN/1.73M2
EOSINOPHIL # BLD AUTO: 0.1 10E3/UL (ref 0–0.7)
EOSINOPHIL NFR BLD AUTO: 1 %
ERYTHROCYTE [DISTWIDTH] IN BLOOD BY AUTOMATED COUNT: 11.8 % (ref 10–15)
GLUCOSE SERPL-MCNC: 78 MG/DL (ref 70–99)
GLUCOSE UR STRIP-MCNC: NEGATIVE MG/DL
HCG UR QL: NEGATIVE
HCO3 SERPL-SCNC: 25 MMOL/L (ref 22–29)
HCT VFR BLD AUTO: 40.4 % (ref 35–47)
HGB BLD-MCNC: 13.5 G/DL (ref 11.7–15.7)
HGB UR QL STRIP: NEGATIVE
HYALINE CASTS: 1 /LPF
IMM GRANULOCYTES # BLD: 0 10E3/UL
IMM GRANULOCYTES NFR BLD: 0 %
KETONES UR STRIP-MCNC: 80 MG/DL
LEUKOCYTE ESTERASE UR QL STRIP: NEGATIVE
LIPASE SERPL-CCNC: 14 U/L (ref 13–60)
LYMPHOCYTES # BLD AUTO: 1.6 10E3/UL (ref 0.8–5.3)
LYMPHOCYTES NFR BLD AUTO: 25 %
MCH RBC QN AUTO: 32.1 PG (ref 26.5–33)
MCHC RBC AUTO-ENTMCNC: 33.4 G/DL (ref 31.5–36.5)
MCV RBC AUTO: 96 FL (ref 78–100)
MONOCYTES # BLD AUTO: 0.6 10E3/UL (ref 0–1.3)
MONOCYTES NFR BLD AUTO: 9 %
MUCOUS THREADS #/AREA URNS LPF: PRESENT /LPF
NEUTROPHILS # BLD AUTO: 4.2 10E3/UL (ref 1.6–8.3)
NEUTROPHILS NFR BLD AUTO: 64 %
NITRATE UR QL: NEGATIVE
NRBC # BLD AUTO: 0 10E3/UL
NRBC BLD AUTO-RTO: 0 /100
PH UR STRIP: 6.5 [PH] (ref 5–7)
PLATELET # BLD AUTO: 230 10E3/UL (ref 150–450)
POTASSIUM SERPL-SCNC: 3.5 MMOL/L (ref 3.4–5.3)
PROT SERPL-MCNC: 6.7 G/DL (ref 6.4–8.3)
RBC # BLD AUTO: 4.21 10E6/UL (ref 3.8–5.2)
RBC URINE: <1 /HPF
SODIUM SERPL-SCNC: 140 MMOL/L (ref 135–145)
SP GR UR STRIP: 1.02 (ref 1–1.03)
SQUAMOUS EPITHELIAL: 1 /HPF
UROBILINOGEN UR STRIP-MCNC: <2 MG/DL
WBC # BLD AUTO: 6.6 10E3/UL (ref 4–11)
WBC URINE: 0 /HPF

## 2024-08-19 PROCEDURE — 82248 BILIRUBIN DIRECT: CPT | Performed by: FAMILY MEDICINE

## 2024-08-19 PROCEDURE — 250N000011 HC RX IP 250 OP 636: Performed by: FAMILY MEDICINE

## 2024-08-19 PROCEDURE — 83690 ASSAY OF LIPASE: CPT | Performed by: FAMILY MEDICINE

## 2024-08-19 PROCEDURE — 81025 URINE PREGNANCY TEST: CPT | Performed by: FAMILY MEDICINE

## 2024-08-19 PROCEDURE — 250N000013 HC RX MED GY IP 250 OP 250 PS 637: Performed by: FAMILY MEDICINE

## 2024-08-19 PROCEDURE — 81001 URINALYSIS AUTO W/SCOPE: CPT | Performed by: FAMILY MEDICINE

## 2024-08-19 PROCEDURE — 85025 COMPLETE CBC W/AUTO DIFF WBC: CPT | Performed by: FAMILY MEDICINE

## 2024-08-19 PROCEDURE — 99285 EMERGENCY DEPT VISIT HI MDM: CPT | Mod: 25

## 2024-08-19 PROCEDURE — 80053 COMPREHEN METABOLIC PANEL: CPT | Performed by: FAMILY MEDICINE

## 2024-08-19 PROCEDURE — 74177 CT ABD & PELVIS W/CONTRAST: CPT

## 2024-08-19 PROCEDURE — 96374 THER/PROPH/DIAG INJ IV PUSH: CPT

## 2024-08-19 PROCEDURE — 36415 COLL VENOUS BLD VENIPUNCTURE: CPT | Performed by: FAMILY MEDICINE

## 2024-08-19 RX ORDER — KETOROLAC TROMETHAMINE 15 MG/ML
15 INJECTION, SOLUTION INTRAMUSCULAR; INTRAVENOUS ONCE
Status: COMPLETED | OUTPATIENT
Start: 2024-08-19 | End: 2024-08-19

## 2024-08-19 RX ORDER — IOPAMIDOL 755 MG/ML
75 INJECTION, SOLUTION INTRAVASCULAR ONCE
Status: COMPLETED | OUTPATIENT
Start: 2024-08-19 | End: 2024-08-19

## 2024-08-19 RX ORDER — DICYCLOMINE HCL 20 MG
20 TABLET ORAL ONCE
Status: COMPLETED | OUTPATIENT
Start: 2024-08-19 | End: 2024-08-19

## 2024-08-19 RX ORDER — DICYCLOMINE HCL 20 MG
20 TABLET ORAL 4 TIMES DAILY PRN
Qty: 20 TABLET | Refills: 0 | Status: SHIPPED | OUTPATIENT
Start: 2024-08-19 | End: 2024-08-29

## 2024-08-19 RX ADMIN — IOPAMIDOL 75 ML: 755 INJECTION, SOLUTION INTRAVENOUS at 17:08

## 2024-08-19 RX ADMIN — DICYCLOMINE HYDROCHLORIDE 20 MG: 20 TABLET ORAL at 17:59

## 2024-08-19 RX ADMIN — KETOROLAC TROMETHAMINE 15 MG: 15 INJECTION, SOLUTION INTRAMUSCULAR; INTRAVENOUS at 16:12

## 2024-08-19 ASSESSMENT — ACTIVITIES OF DAILY LIVING (ADL)
ADLS_ACUITY_SCORE: 36
ADLS_ACUITY_SCORE: 34
ADLS_ACUITY_SCORE: 36

## 2024-08-19 ASSESSMENT — COLUMBIA-SUICIDE SEVERITY RATING SCALE - C-SSRS
2. HAVE YOU ACTUALLY HAD ANY THOUGHTS OF KILLING YOURSELF IN THE PAST MONTH?: NO
6. HAVE YOU EVER DONE ANYTHING, STARTED TO DO ANYTHING, OR PREPARED TO DO ANYTHING TO END YOUR LIFE?: NO
1. IN THE PAST MONTH, HAVE YOU WISHED YOU WERE DEAD OR WISHED YOU COULD GO TO SLEEP AND NOT WAKE UP?: NO

## 2024-08-19 NOTE — ED PROVIDER NOTES
"EMERGENCY DEPARTMENT ENCOUNTER      NAME: Tanya Pearson  AGE: 29 year old female  YOB: 1995  MRN: [id number]  EVALUATION DATE & TIME: 8/19/2024  3:51 PM    PCP: Dara Pickett    ED PROVIDER: Darell Vazquez M.D.    Chief Complaint   Patient presents with    Abdominal Pain       FINAL IMPRESSION:  1. Generalized abdominal pain        ED COURSE & MEDICAL DECISION MAKING:    Pertinent Labs & Imaging studies independently interpreted by me. (See chart for details)  Reviewed prior office visit from January 2024, diagnosed with viral gastroenteritis and discharged.    ED Course as of 08/19/24 1804   Mon Aug 19, 2024   1431 Patient seen in the [facility] due to critical capacity in the department.  Presents today with abdominal pain that started last night, also some diarrhea.  Last menstrual period is now, denies possibility of pregnancy.  No nausea or vomiting, no fever.  No urinary symptoms.  On exam, well-appearing, vitally stable, diffuse abdominal tenderness.  Consider enteritis, prior appendectomy.  Diverticulitis less likely given patient's age, pancreatitis possible but no risk factors.  Toradol ordered along with fluids, CT abdomen pelvis.  Did consider ovarian pathology but she has no increased lower abdominal or adnexal tenderness.  Patient complains of \"10/10\" abdominal pain, moves easily from waiting room to exam room, mild tenderness on exam   1644 Labs ordered and independently interpreted by me with normal CBC, normal urinalysis, negative pregnancy test   1715 CT abdomen and pelvis independently interpreted by me with moderate stool in the ascending colon, no evidence for enteritis, appendix is normal, no gallstones or gallbladder wall thickening.   1742 Reviewed radiology interpretation of CT scan which agrees with my initial interpretation with no acute findings.  Patient is stable for discharge with Bentyl.         At the conclusion of the encounter I discussed the results of " all of the tests and the disposition. The questions were answered. The patient or family acknowledged understanding and was agreeable with the care plan.     Medical Decision Making  Obtained supplemental history:Supplemental history obtained?: Family Member/Significant Other  Reviewed external records: External records reviewed?: Documented in chart  Care impacted by chronic illness:Chronic Lung Disease  Care significantly affected by social determinants of health:Access to Affordable Health Care  Did you consider but not order tests?: Work up considered but not performed and documented in chart, if applicable  Did you interpret images independently?: Independent interpretation of ECG and images noted in documentation, when applicable.  Consultation discussion with other provider:Did you involve another provider (consultant, , pharmacy, etc.)?: No  Discharge. I prescribed additional prescription strength medication(s) as charted. N/A.    No MIPS measures identified.    MEDICATIONS GIVEN IN THE EMERGENCY:  Medications   ketorolac (TORADOL) injection 15 mg (15 mg Intravenous $Given 8/19/24 1612)   iopamidol (ISOVUE-370) solution 75 mL (75 mLs Intravenous $Given 8/19/24 1709)   dicyclomine (BENTYL) tablet 20 mg (20 mg Oral $Given 8/19/24 6330)       NEW PRESCRIPTIONS STARTED AT TODAY'S ER VISIT  New Prescriptions    DICYCLOMINE (BENTYL) 20 MG TABLET    Take 1 tablet (20 mg) by mouth 4 times daily as needed (abdominal pain)       =================================================================    HPI    Patient information was obtained from: Patient and mother      Tanya Pearson is a 29 year old female with a pertinent history of anxiety, adjustment disorder, carcinoid tumor who presents to this ED for evaluation of abdominal pain.  Patient reports mid abdominal pain and left-sided abdominal pain since yesterday.  No vaginal bleeding, no nausea or vomiting, diarrhea yesterday but none today.  Has not taken  anything for this.  Prior appendectomy      REVIEW OF SYSTEMS   Review of Systems   All other systems reviewed and negative    PAST MEDICAL HISTORY:  No past medical history on file.    PAST SURGICAL HISTORY:  Past Surgical History:   Procedure Laterality Date    EGD  01/20/2023    erythematous gastropathy, hiatal hernia, [facility]    ESOPHAGOSCOPY, GASTROSCOPY, DUODENOSCOPY (EGD), COMBINED N/A 11/28/2022    Procedure: ESOPHAGOGASTRODUODENOSCOPY, WITH BIOPSY;  Surgeon: Lexie Peck MD;  Location: [facility] OR    LAPAROSCOPIC APPENDECTOMY N/A 04/19/2021    Procedure: APPENDECTOMY, LAPAROSCOPIC;  Surgeon: Doc Vazquez DO;  Location: [facility] OR    NO HISTORY OF SURGERY         CURRENT MEDICATIONS:    No current facility-administered medications for this encounter.     Current Outpatient Medications   Medication Sig Dispense Refill    dicyclomine (BENTYL) 20 MG tablet Take 1 tablet (20 mg) by mouth 4 times daily as needed (abdominal pain) 20 tablet 0    albuterol (PROAIR HFA/PROVENTIL HFA/VENTOLIN HFA) 108 (90 Base) MCG/ACT inhaler Inhale 2 puffs into the lungs every 6 hours as needed for shortness of breath, wheezing or cough 18 g 1       ALLERGIES:  Allergies   Allergen Reactions    Amoxicillin Rash       FAMILY HISTORY:  Family History   Problem Relation Age of Onset    Cancer Maternal Grandmother         skin cancer    Breast Cancer Paternal Grandmother 76       SOCIAL HISTORY:   Social History     Socioeconomic History    Marital status: Single    Number of children: 0   Occupational History    Occupation: working at [employer]   Tobacco Use    Smoking status: Every Day     Types: Vaping Device    Smokeless tobacco: Never   Vaping Use    Vaping status: Never Used   Substance and Sexual Activity    Alcohol use: Yes     Comment: occasional    Drug use: No    Sexual activity: Yes     Partners: Female     Social Determinants of Health     Financial Resource Strain: Low Risk  (1/31/2024)    Financial  "Resource Strain     Within the past 12 months, have you or your family members you live with been unable to get utilities (heat, electricity) when it was really needed?: No   Food Insecurity: Low Risk  (1/31/2024)    Food Insecurity     Within the past 12 months, did you worry that your food would run out before you got money to buy more?: No     Within the past 12 months, did the food you bought just not last and you didn t have money to get more?: No   Transportation Needs: Low Risk  (1/31/2024)    Transportation Needs     Within the past 12 months, has lack of transportation kept you from medical appointments, getting your medicines, non-medical meetings or appointments, work, or from getting things that you need?: No   Interpersonal Safety: Low Risk  (1/31/2024)    Interpersonal Safety     Do you feel physically and emotionally safe where you currently live?: Yes     Within the past 12 months, have you been hit, slapped, kicked or otherwise physically hurt by someone?: No     Within the past 12 months, have you been humiliated or emotionally abused in other ways by your partner or ex-partner?: No   Housing Stability: Low Risk  (1/31/2024)    Housing Stability     Do you have housing? : Yes     Are you worried about losing your housing?: No       VITALS:  /72   Pulse 59   Temp 98.7  F (37.1  C) (Oral)   Resp 20   Ht 1.702 m (5' 7\")   Wt 56.3 kg (124 lb 1.6 oz)   LMP 08/10/2024   SpO2 100%   BMI 19.44 kg/m      PHYSICAL EXAM:  Physical Exam  Vitals and nursing note reviewed.   Constitutional:       General: She is not in acute distress.     Appearance: Normal appearance. She is not ill-appearing.   HENT:      Head: Normocephalic and atraumatic.      Right Ear: External ear normal.      Left Ear: External ear normal.      Nose: Nose normal.      Mouth/Throat:      Mouth: Mucous membranes are moist.   Eyes:      Extraocular Movements: Extraocular movements intact.      Conjunctiva/sclera: Conjunctivae " normal.      Pupils: Pupils are equal, round, and reactive to light.   Cardiovascular:      Rate and Rhythm: Normal rate and regular rhythm.   Pulmonary:      Effort: Pulmonary effort is normal.      Breath sounds: Normal breath sounds. No wheezing or rales.   Abdominal:      General: Abdomen is flat. There is no distension.      Palpations: Abdomen is soft.      Tenderness: There is abdominal tenderness (mild diffuse). There is no guarding.   Musculoskeletal:         General: Normal range of motion.      Cervical back: Normal range of motion and neck supple.      Right lower leg: No edema.      Left lower leg: No edema.   Lymphadenopathy:      Cervical: No cervical adenopathy.   Skin:     General: Skin is warm and dry.   Neurological:      General: No focal deficit present.      Mental Status: She is alert and oriented to person, place, and time. Mental status is at baseline.      Comments: No gross focal neurologic deficits   Psychiatric:         Mood and Affect: Mood normal.         Behavior: Behavior normal.         Thought Content: Thought content normal.          LAB:  All pertinent labs reviewed and interpreted.  Results for orders placed or performed during the hospital encounter of 08/19/24   CT Abdomen Pelvis w Contrast    Impression    IMPRESSION:   No acute findings or other explanation for symptoms.   Basic metabolic panel   Result Value Ref Range    Sodium 140 135 - 145 mmol/L    Potassium 3.5 3.4 - 5.3 mmol/L    Chloride 103 98 - 107 mmol/L    Carbon Dioxide (CO2) 25 22 - 29 mmol/L    Anion Gap 12 7 - 15 mmol/L    Urea Nitrogen 11.1 6.0 - 20.0 mg/dL    Creatinine 0.69 0.51 - 0.95 mg/dL    GFR Estimate >90 >60 mL/min/1.73m2    Calcium 8.7 (L) 8.8 - 10.4 mg/dL    Glucose 78 70 - 99 mg/dL   Hepatic function panel   Result Value Ref Range    Protein Total 6.7 6.4 - 8.3 g/dL    Albumin 4.5 3.5 - 5.2 g/dL    Bilirubin Total 1.1 <=1.2 mg/dL    Alkaline Phosphatase 58 40 - 150 U/L    AST 16 0 - 45 U/L    ALT  15 0 - 50 U/L    Bilirubin Direct 0.22 0.00 - 0.30 mg/dL   Result Value Ref Range    Lipase 14 13 - 60 U/L   HCG qualitative urine   Result Value Ref Range    hCG Urine Qualitative Negative Negative   UA with Microscopic reflex to Culture    Specimen: Urine, Midstream   Result Value Ref Range    Color Urine Light Yellow Colorless, Straw, Light Yellow, Yellow    Appearance Urine Clear Clear    Glucose Urine Negative Negative mg/dL    Bilirubin Urine Negative Negative    Ketones Urine 80 (A) Negative mg/dL    Specific Gravity Urine 1.023 1.001 - 1.030    Blood Urine Negative Negative    pH Urine 6.5 5.0 - 7.0    Protein Albumin Urine Negative Negative mg/dL    Urobilinogen Urine <2.0 <2.0 mg/dL    Nitrite Urine Negative Negative    Leukocyte Esterase Urine Negative Negative    Bacteria Urine Few (A) None Seen /HPF    Mucus Urine Present (A) None Seen /LPF    RBC Urine <1 <=2 /HPF    WBC Urine 0 <=5 /HPF    Squamous Epithelials Urine 1 <=1 /HPF    Hyaline Casts Urine 1 <=2 /LPF   CBC with platelets and differential   Result Value Ref Range    WBC Count 6.6 4.0 - 11.0 10e3/uL    RBC Count 4.21 3.80 - 5.20 10e6/uL    Hemoglobin 13.5 11.7 - 15.7 g/dL    Hematocrit 40.4 35.0 - 47.0 %    MCV 96 78 - 100 fL    MCH 32.1 26.5 - 33.0 pg    MCHC 33.4 31.5 - 36.5 g/dL    RDW 11.8 10.0 - 15.0 %    Platelet Count 230 150 - 450 10e3/uL    % Neutrophils 64 %    % Lymphocytes 25 %    % Monocytes 9 %    % Eosinophils 1 %    % Basophils 1 %    % Immature Granulocytes 0 %    NRBCs per 100 WBC 0 <1 /100    Absolute Neutrophils 4.2 1.6 - 8.3 10e3/uL    Absolute Lymphocytes 1.6 0.8 - 5.3 10e3/uL    Absolute Monocytes 0.6 0.0 - 1.3 10e3/uL    Absolute Eosinophils 0.1 0.0 - 0.7 10e3/uL    Absolute Basophils 0.0 0.0 - 0.2 10e3/uL    Absolute Immature Granulocytes 0.0 <=0.4 10e3/uL    Absolute NRBCs 0.0 10e3/uL       RADIOLOGY:  Reviewed all pertinent imaging. Please see official radiology report.  CT Abdomen Pelvis w Contrast   Final Result    IMPRESSION:    No acute findings or other explanation for symptoms.        Darell Vazquez M.D.  Emergency Medicine  [facility] EMERGENCY DEPARTMENT  [facility] [facility] [id number] [phone]  Dept: [phone]       Darell Vazquez MD  08/19/24 7918

## 2024-08-19 NOTE — ED TRIAGE NOTES
Amb to triage.  Pt states having sharp pain in middle abd and to the L of umbilicus since yesterday.  Decreased appetite as well.  Diarrhea yesterday.  Denies n/v.      Triage Assessment (Adult)       Row Name 08/19/24 4138          Triage Assessment    Airway WDL WDL        Respiratory WDL    Respiratory WDL WDL        Skin Circulation/Temperature WDL    Skin Circulation/Temperature WDL WDL        Cardiac WDL    Cardiac WDL WDL        Peripheral/Neurovascular WDL    Peripheral Neurovascular WDL WDL        Cognitive/Neuro/Behavioral WDL    Cognitive/Neuro/Behavioral WDL WDL

## 2024-08-19 NOTE — Clinical Note
Tanya Pearson was seen and treated in our emergency department on 8/19/2024.  She may return to work on 08/21/2024.       If you have any questions or concerns, please don't hesitate to call.      Darell Vazquez MD

## 2024-10-24 ENCOUNTER — TRANSFERRED RECORDS (OUTPATIENT)
Dept: HEALTH INFORMATION MANAGEMENT | Facility: CLINIC | Age: 29
End: 2024-10-24
Payer: COMMERCIAL

## 2025-06-14 ENCOUNTER — HEALTH MAINTENANCE LETTER (OUTPATIENT)
Age: 30
End: 2025-06-14

## (undated) DEVICE — ENDO FORCEP BX CAPTURA PRO SPIKE G50696

## (undated) DEVICE — SUCTION MANIFOLD NEPTUNE 2 SYS 1 PORT 702-025-000

## (undated) DEVICE — SU ENDO POLYSORB 0 3" LOOP 21" EL-21-L

## (undated) DEVICE — SYR 30ML SLIP TIP W/O NDL 302833

## (undated) DEVICE — ENDO BABCOCK 05MM 5BB

## (undated) DEVICE — ESU LIGASURE MARYLAND LAPAROSCOPIC SLR/DVDR 5MMX37CM LF1937

## (undated) DEVICE — ENDO TRAP POLYP E-TRAP 00711099

## (undated) DEVICE — GLOVE PROTEXIS BLUE W/NEU-THERA 7.5  2D73EB75

## (undated) DEVICE — SU VICRYL 0 UR-6 27" J603H

## (undated) DEVICE — ANTIFOG SOLUTION W/FOAM PAD 31142527

## (undated) DEVICE — DECANTER VIAL 2006S

## (undated) DEVICE — STPL POWERED ECHELON 45MM PSEE45A

## (undated) DEVICE — SOL WATER IRRIG 500ML BOTTLE 2F7113

## (undated) DEVICE — PREP CHLORAPREP 26ML TINTED ORANGE  260815

## (undated) DEVICE — Device

## (undated) DEVICE — DEVICE SUTURE PASSER 14GA WECK EFX EFXSP2

## (undated) DEVICE — ADH SKIN CLOSURE PREMIERPRO EXOFIN 1.0ML 3470

## (undated) DEVICE — ENDO TROCAR SLEEVE KII ADV FIXATION 05X100MM CFS02

## (undated) DEVICE — TUBING SUCTION 12"X1/4" N612

## (undated) DEVICE — ENDO POUCH UNIV RETRIEVAL SYSTEM INZII 10MM CD001

## (undated) DEVICE — SOL WATER IRRIG 1000ML BOTTLE 2F7114

## (undated) DEVICE — ENDO BITE BLOCK ADULT OMNI-BLOC

## (undated) DEVICE — LINEN TOWEL PACK X6 WHITE 5487

## (undated) DEVICE — ENDO POUCH UNIVERSAL RETRIEVAL SYSTEM INZII 5MM CD003

## (undated) DEVICE — SUCTION CATH AIRLIFE TRI-FLO W/CONTROL PORT 14FR  T60C

## (undated) DEVICE — GOWN IMPERVIOUS 2XL BLUE

## (undated) DEVICE — GLOVE PROTEXIS MICRO 7.0  2D73PM70

## (undated) DEVICE — LINEN TOWEL PACK X5 5464

## (undated) DEVICE — KIT ENDO TURNOVER/PROCEDURE CARRY-ON 101822

## (undated) DEVICE — LIGHT HANDLE X1 31140133

## (undated) DEVICE — SU MONOCRYL 4-0 PS-2 27" UND Y426H

## (undated) DEVICE — STPL RELOAD REG TISSUE ECHELON 45 X 3.6MM BLUE GST45B

## (undated) DEVICE — ENDO TROCAR FIRST ENTRY KII FIOS ADV FIX 05X100MM CFF03

## (undated) DEVICE — GLOVE EXAM NITRILE LG PF LATEX FREE 5064

## (undated) RX ORDER — ONDANSETRON 2 MG/ML
INJECTION INTRAMUSCULAR; INTRAVENOUS
Status: DISPENSED
Start: 2021-04-19

## (undated) RX ORDER — MANNITOL 20 G/100ML
INJECTION, SOLUTION INTRAVENOUS
Status: DISPENSED
Start: 2021-04-19

## (undated) RX ORDER — FENTANYL CITRATE 50 UG/ML
INJECTION, SOLUTION INTRAMUSCULAR; INTRAVENOUS
Status: DISPENSED
Start: 2021-04-19

## (undated) RX ORDER — HYDROMORPHONE HYDROCHLORIDE 1 MG/ML
INJECTION, SOLUTION INTRAMUSCULAR; INTRAVENOUS; SUBCUTANEOUS
Status: DISPENSED
Start: 2021-04-19

## (undated) RX ORDER — BUPIVACAINE HYDROCHLORIDE AND EPINEPHRINE 5; 5 MG/ML; UG/ML
INJECTION, SOLUTION PERINEURAL
Status: DISPENSED
Start: 2021-04-19

## (undated) RX ORDER — ESMOLOL HYDROCHLORIDE 10 MG/ML
INJECTION INTRAVENOUS
Status: DISPENSED
Start: 2021-04-19

## (undated) RX ORDER — FENTANYL CITRATE 50 UG/ML
INJECTION, SOLUTION INTRAMUSCULAR; INTRAVENOUS
Status: DISPENSED
Start: 2022-11-28

## (undated) RX ORDER — DEXAMETHASONE SODIUM PHOSPHATE 4 MG/ML
INJECTION, SOLUTION INTRA-ARTICULAR; INTRALESIONAL; INTRAMUSCULAR; INTRAVENOUS; SOFT TISSUE
Status: DISPENSED
Start: 2021-04-19

## (undated) RX ORDER — FUROSEMIDE 10 MG/ML
INJECTION INTRAMUSCULAR; INTRAVENOUS
Status: DISPENSED
Start: 2021-04-19

## (undated) RX ORDER — KETOROLAC TROMETHAMINE 30 MG/ML
INJECTION, SOLUTION INTRAMUSCULAR; INTRAVENOUS
Status: DISPENSED
Start: 2021-04-19

## (undated) RX ORDER — ALBUTEROL SULFATE 90 UG/1
AEROSOL, METERED RESPIRATORY (INHALATION)
Status: DISPENSED
Start: 2021-04-19

## (undated) RX ORDER — NEOSTIGMINE METHYLSULFATE 1 MG/ML
VIAL (ML) INJECTION
Status: DISPENSED
Start: 2021-04-19

## (undated) RX ORDER — ALBUMIN, HUMAN INJ 5% 5 %
SOLUTION INTRAVENOUS
Status: DISPENSED
Start: 2021-04-19

## (undated) RX ORDER — BUPIVACAINE HYDROCHLORIDE 5 MG/ML
INJECTION, SOLUTION EPIDURAL; INTRACAUDAL
Status: DISPENSED
Start: 2021-04-19